# Patient Record
Sex: FEMALE | Race: WHITE | NOT HISPANIC OR LATINO | Employment: STUDENT | URBAN - METROPOLITAN AREA
[De-identification: names, ages, dates, MRNs, and addresses within clinical notes are randomized per-mention and may not be internally consistent; named-entity substitution may affect disease eponyms.]

---

## 2023-08-24 PROBLEM — Z76.89 ENCOUNTER TO ESTABLISH CARE: Status: ACTIVE | Noted: 2023-08-24

## 2023-08-24 NOTE — PROGRESS NOTES
Date: 08/31/2023  Patient ID: [id number]   Chief Complaint: Establish Care (New patient to establish care)    HPI:   Radha Godinez is a 22 y.o. female who is here today to establish care  From [facility] and will graduate college soon and needs a PCP.   Patient does go to therapy for anxiety, but she has been on Celexa for 2yrs (first and only med for anxiety) and feels like it is not helping anymore. Does get panic attacks 1/mo surrounding cycle. Denies SI/HI. Patient also has been trying to actively lose weight with diet and exercise but has instead gained weight for the past 2yrs.   Does have pruritic rash on posterior R thigh that started 2 mos ago with ingrown hair. Has scant thick white discharge at times. Does have h/o ingrown hairs in same spot multiple times and recently started on L leg. Does use topical wash that helps relieve itch. Denies fevers/chills, bleeding, drainage.   Past Medical History:   Diagnosis Date    Anxiety disorder, unspecified     Keratosis pilaris     Obesity (BMI 35.0-39.9 without comorbidity) 08/31/2023     Past Surgical History:   Procedure Laterality Date    TONSILLECTOMY      wisdom teeth removal       Review of patient's allergies indicates:   Allergen Reactions    Latex, natural rubber Rash     Outpatient Medications Marked as Taking for the 8/31/23 encounter (Office Visit) with Cindy Villa MD   Medication Sig Dispense Refill    norgestimate-ethinyl estradioL (ORTHO TRI-CYCLEN,TRI-SPRINTEC) 0.18/0.215/0.25 mg-35 mcg (28) tablet       [DISCONTINUED] citalopram (CELEXA) 20 MG tablet Take 20 mg by mouth every morning.       Family History   Problem Relation Age of Onset    Diabetes type II Father     Diabetes type II Paternal Grandmother     Diabetes type II Paternal Grandfather     Diabetes type I Maternal Uncle         Late onset      Social History     Socioeconomic History    Marital status: Single   Occupational History    Occupation: graduating environmental science 2023  "  Tobacco Use    Smoking status: Never    Smokeless tobacco: Never   Substance and Sexual Activity    Alcohol use: Yes     Comment: 1/wk    Drug use: Never    Sexual activity: Yes     Partners: Male     Comment: long term bf     Patient Care Team:  Cindy Villa MD as PCP - General (Family Medicine)  Eloina Prasad MD as Consulting Physician (Obstetrics and Gynecology)   Subjective:   ROS  See HPI for details  All Other ROS: Negative except as stated in HPI  Objective:   BP (!) 142/84   Pulse 91   Temp 98.4 °F (36.9 °C)   Ht 5' 11" (1.803 m)   Wt 120.2 kg (265 lb)   SpO2 96%   BMI 36.96 kg/m²   Physical Exam  Constitutional:       General: She is not in acute distress.     Appearance: She is obese. She is not ill-appearing.   Skin:     Comments: 1 cm subcutaneous nodule posterior proximal right thigh with hyperpigmentation and without erythema, bleeding, discharge, tenderness  Posterior left thigh with minimal excoriations without bleeding, discharge, tenderness, and edema   Neurological:      Mental Status: She is alert.       Assessment:       ICD-10-CM ICD-9-CM   1. Encounter to establish care  Z76.89 V65.8   2. Anxiety disorder, unspecified type  F41.9 300.00   3. Obesity (BMI 35.0-39.9 without comorbidity)  E66.9 278.00   4. Sebaceous cyst  L72.3 706.2      Plan:   1. Encounter to establish care  Overview:  Obtain routine labs  F/u for wellness      Orders:  -     CBC Auto Differential; Future; Expected date: 08/31/2023  -     Comprehensive Metabolic Panel; Future; Expected date: 08/31/2023  -     Lipid Panel; Future; Expected date: 08/31/2023  -     TSH; Future; Expected date: 08/31/2023  -     Hemoglobin A1C; Future; Expected date: 08/31/2023  -     Urinalysis; Future; Expected date: 08/31/2023  -     T4, Free; Future; Expected date: 08/31/2023  -     Vitamin D; Future; Expected date: 08/31/2023  -     HIV 1/2 Ag/Ab (4th Gen); Future; Expected date: 08/31/2023    2. Anxiety disorder, unspecified " type  Overview:  Stop Celexa  Start Zoloft 50mg qd  Practice deep breathing or abdominal breathing exercises when anxiety occurs.  Exercise daily. Get sunlight daily.  Avoid caffeine, alcohol and stimulants.  Practice positive phrases and repeat throughout the day, along with yoga and relaxation techniques.  Set healthy boundaries, avoid people and conversations that increase stress.  Educated patient on the risks of serotonin based medications such as serotonin modulators and SSRIs/SNRIs including common side effects of nausea, GI upset, headache dizziness as well as the rare risk for worsening symptoms of depression including development of suicidal thoughts or ideations, and serotonin syndrome.   Discussed benefits of medication not becoming noticeable until up to 6 weeks from start date.   Report any symptoms of suicidal or homicidal ideations immediately, if clinic is closed go to nearest emergency room.        Orders:  -     sertraline (ZOLOFT) 50 MG tablet; Take 1 tablet (50 mg total) by mouth once daily.  Dispense: 30 tablet; Refill: 11    3. Obesity (BMI 35.0-39.9 without comorbidity)  Overview:  Body mass index is 36.96 kg/m².  Recommend intensive, multicomponent, behavioral interventions:  Goal BMI <30.  Goal is to exercise at least 5 times a week for 30 minutes per day.   -Stand more each day.   -Increase exercise: Start with 10 minutes daily and build up to 60-90 minutes/day with moderate activity  Reduce caloric intake:  -Women: 6826-5721 kcal/day  Avoid soda, simple sugars, excessive rice, potatoes or bread. Limit fast foods and fried foods.  Choose complex carbs in moderation (example: green vegetables, beans, oatmeal). Eat plenty of fresh fruits and vegetables with lean meats daily.  Do not skip meals. Eat a balanced portion size.  Avoid fad diets. Consider long-term healthy life style changes.         4. Sebaceous cyst  Overview:  Keep area clean and dry  Refer to dermatology for excision   Start  Atarax for itching      Orders:  -     Discontinue: hydrOXYzine HCL (ATARAX) 25 MG tablet; Take 1 tablet (25 mg total) by mouth 3 (three) times daily.  Dispense: 30 tablet; Refill: 1  -     Ambulatory referral/consult to Dermatology; Future; Expected date: 09/07/2023  -     hydrOXYzine HCL (ATARAX) 25 MG tablet; Take 1 tablet (25 mg total) by mouth nightly as needed for Itching.  Dispense: 30 tablet; Refill: 1                Follow up in about 4 weeks (around 9/28/2023) for Wellness, with labs. In addition to their scheduled follow up, the patient has also been instructed to follow up on as needed basis.

## 2023-08-31 ENCOUNTER — OFFICE VISIT (OUTPATIENT)
Dept: PRIMARY CARE CLINIC | Facility: CLINIC | Age: 23
End: 2023-08-31
Payer: COMMERCIAL

## 2023-08-31 VITALS
SYSTOLIC BLOOD PRESSURE: 142 MMHG | BODY MASS INDEX: 37.1 KG/M2 | TEMPERATURE: 98 F | HEIGHT: 71 IN | OXYGEN SATURATION: 96 % | HEART RATE: 91 BPM | WEIGHT: 265 LBS | DIASTOLIC BLOOD PRESSURE: 84 MMHG

## 2023-08-31 DIAGNOSIS — E66.9 OBESITY (BMI 35.0-39.9 WITHOUT COMORBIDITY): ICD-10-CM

## 2023-08-31 DIAGNOSIS — F41.9 ANXIETY DISORDER, UNSPECIFIED TYPE: ICD-10-CM

## 2023-08-31 DIAGNOSIS — Z76.89 ENCOUNTER TO ESTABLISH CARE: Primary | ICD-10-CM

## 2023-08-31 DIAGNOSIS — L72.3 SEBACEOUS CYST: ICD-10-CM

## 2023-08-31 PROBLEM — Z78.9 USES BIRTH CONTROL: Status: ACTIVE | Noted: 2023-08-31

## 2023-08-31 PROBLEM — R21 RASH: Status: ACTIVE | Noted: 2023-08-31

## 2023-08-31 PROBLEM — R21 RASH: Status: RESOLVED | Noted: 2023-08-31 | Resolved: 2023-08-31

## 2023-08-31 PROCEDURE — 3008F PR BODY MASS INDEX (BMI) DOCUMENTED: ICD-10-PCS | Mod: CPTII,,, | Performed by: STUDENT IN AN ORGANIZED HEALTH CARE EDUCATION/TRAINING PROGRAM

## 2023-08-31 PROCEDURE — 99204 OFFICE O/P NEW MOD 45 MIN: CPT | Mod: ,,, | Performed by: STUDENT IN AN ORGANIZED HEALTH CARE EDUCATION/TRAINING PROGRAM

## 2023-08-31 PROCEDURE — 1159F PR MEDICATION LIST DOCUMENTED IN MEDICAL RECORD: ICD-10-PCS | Mod: CPTII,,, | Performed by: STUDENT IN AN ORGANIZED HEALTH CARE EDUCATION/TRAINING PROGRAM

## 2023-08-31 PROCEDURE — 1160F PR REVIEW ALL MEDS BY PRESCRIBER/CLIN PHARMACIST DOCUMENTED: ICD-10-PCS | Mod: CPTII,,, | Performed by: STUDENT IN AN ORGANIZED HEALTH CARE EDUCATION/TRAINING PROGRAM

## 2023-08-31 PROCEDURE — 3008F BODY MASS INDEX DOCD: CPT | Mod: CPTII,,, | Performed by: STUDENT IN AN ORGANIZED HEALTH CARE EDUCATION/TRAINING PROGRAM

## 2023-08-31 PROCEDURE — 1160F RVW MEDS BY RX/DR IN RCRD: CPT | Mod: CPTII,,, | Performed by: STUDENT IN AN ORGANIZED HEALTH CARE EDUCATION/TRAINING PROGRAM

## 2023-08-31 PROCEDURE — 3079F DIAST BP 80-89 MM HG: CPT | Mod: CPTII,,, | Performed by: STUDENT IN AN ORGANIZED HEALTH CARE EDUCATION/TRAINING PROGRAM

## 2023-08-31 PROCEDURE — 3077F PR MOST RECENT SYSTOLIC BLOOD PRESSURE >= 140 MM HG: ICD-10-PCS | Mod: CPTII,,, | Performed by: STUDENT IN AN ORGANIZED HEALTH CARE EDUCATION/TRAINING PROGRAM

## 2023-08-31 PROCEDURE — 3077F SYST BP >= 140 MM HG: CPT | Mod: CPTII,,, | Performed by: STUDENT IN AN ORGANIZED HEALTH CARE EDUCATION/TRAINING PROGRAM

## 2023-08-31 PROCEDURE — 3079F PR MOST RECENT DIASTOLIC BLOOD PRESSURE 80-89 MM HG: ICD-10-PCS | Mod: CPTII,,, | Performed by: STUDENT IN AN ORGANIZED HEALTH CARE EDUCATION/TRAINING PROGRAM

## 2023-08-31 PROCEDURE — 99204 PR OFFICE/OUTPT VISIT, NEW, LEVL IV, 45-59 MIN: ICD-10-PCS | Mod: ,,, | Performed by: STUDENT IN AN ORGANIZED HEALTH CARE EDUCATION/TRAINING PROGRAM

## 2023-08-31 PROCEDURE — 1159F MED LIST DOCD IN RCRD: CPT | Mod: CPTII,,, | Performed by: STUDENT IN AN ORGANIZED HEALTH CARE EDUCATION/TRAINING PROGRAM

## 2023-08-31 RX ORDER — NORGESTIMATE AND ETHINYL ESTRADIOL 7DAYSX3 28
KIT ORAL
COMMUNITY
Start: 2014-07-30

## 2023-08-31 RX ORDER — HYDROXYZINE HYDROCHLORIDE 25 MG/1
25 TABLET, FILM COATED ORAL 3 TIMES DAILY
Qty: 30 TABLET | Refills: 1 | Status: SHIPPED | OUTPATIENT
Start: 2023-08-31 | End: 2023-08-31

## 2023-08-31 RX ORDER — SERTRALINE HYDROCHLORIDE 50 MG/1
50 TABLET, FILM COATED ORAL DAILY
Qty: 30 TABLET | Refills: 11 | Status: SHIPPED | OUTPATIENT
Start: 2023-08-31 | End: 2023-09-29 | Stop reason: SDUPTHER

## 2023-08-31 RX ORDER — CITALOPRAM 20 MG/1
20 TABLET, FILM COATED ORAL EVERY MORNING
COMMUNITY
Start: 2023-07-30 | End: 2023-08-31

## 2023-08-31 RX ORDER — HYDROXYZINE HYDROCHLORIDE 25 MG/1
25 TABLET, FILM COATED ORAL NIGHTLY PRN
Qty: 30 TABLET | Refills: 1 | Status: SHIPPED | OUTPATIENT
Start: 2023-08-31

## 2023-09-21 ENCOUNTER — CLINICAL SUPPORT (OUTPATIENT)
Dept: PRIMARY CARE CLINIC | Facility: CLINIC | Age: 23
End: 2023-09-21
Payer: COMMERCIAL

## 2023-09-21 DIAGNOSIS — Z76.89 ENCOUNTER TO ESTABLISH CARE: ICD-10-CM

## 2023-09-21 PROBLEM — Z00.00 WELLNESS EXAMINATION: Status: ACTIVE | Noted: 2023-08-24

## 2023-09-21 LAB
ALBUMIN SERPL-MCNC: 3.6 G/DL (ref 3.5–5)
ALBUMIN/GLOB SERPL: 1.2 RATIO (ref 1.1–2)
ALP SERPL-CCNC: 86 UNIT/L (ref 40–150)
ALT SERPL-CCNC: 29 UNIT/L (ref 0–55)
APPEARANCE UR: ABNORMAL
AST SERPL-CCNC: 26 UNIT/L (ref 5–34)
BACTERIA #/AREA URNS AUTO: ABNORMAL /HPF
BASOPHILS # BLD AUTO: 0.03 X10(3)/MCL
BASOPHILS NFR BLD AUTO: 0.2 %
BILIRUB SERPL-MCNC: 0.8 MG/DL
BILIRUB UR QL STRIP.AUTO: ABNORMAL
BUN SERPL-MCNC: 9.2 MG/DL (ref 7–18.7)
CALCIUM SERPL-MCNC: 8.7 MG/DL (ref 8.4–10.2)
CHLORIDE SERPL-SCNC: 106 MMOL/L (ref 98–107)
CHOLEST SERPL-MCNC: 171 MG/DL
CHOLEST/HDLC SERPL: 5 {RATIO} (ref 0–5)
CO2 SERPL-SCNC: 25 MMOL/L (ref 22–29)
COLOR UR: ABNORMAL
CREAT SERPL-MCNC: 0.65 MG/DL (ref 0.55–1.02)
DEPRECATED CALCIDIOL+CALCIFEROL SERPL-MC: 55.3 NG/ML (ref 30–80)
EOSINOPHIL # BLD AUTO: 0.28 X10(3)/MCL (ref 0–0.9)
EOSINOPHIL NFR BLD AUTO: 2.3 %
ERYTHROCYTE [DISTWIDTH] IN BLOOD BY AUTOMATED COUNT: 12.7 % (ref 11.5–17)
EST. AVERAGE GLUCOSE BLD GHB EST-MCNC: 85.3 MG/DL
GFR SERPLBLD CREATININE-BSD FMLA CKD-EPI: >60 MLS/MIN/1.73/M2
GLOBULIN SER-MCNC: 3 GM/DL (ref 2.4–3.5)
GLUCOSE SERPL-MCNC: 90 MG/DL (ref 74–100)
GLUCOSE UR QL STRIP.AUTO: NEGATIVE
HBA1C MFR BLD: 4.6 %
HCT VFR BLD AUTO: 38.2 % (ref 37–47)
HDLC SERPL-MCNC: 38 MG/DL (ref 35–60)
HGB BLD-MCNC: 12.5 G/DL (ref 12–16)
HIV 1+2 AB+HIV1 P24 AG SERPL QL IA: NONREACTIVE
IMM GRANULOCYTES # BLD AUTO: 0.06 X10(3)/MCL (ref 0–0.04)
IMM GRANULOCYTES NFR BLD AUTO: 0.5 %
KETONES UR QL STRIP.AUTO: NEGATIVE
LDLC SERPL CALC-MCNC: 92 MG/DL (ref 50–140)
LEUKOCYTE ESTERASE UR QL STRIP.AUTO: ABNORMAL
LYMPHOCYTES # BLD AUTO: 3.21 X10(3)/MCL (ref 0.6–4.6)
LYMPHOCYTES NFR BLD AUTO: 26.5 %
MCH RBC QN AUTO: 28.9 PG (ref 27–31)
MCHC RBC AUTO-ENTMCNC: 32.7 G/DL (ref 33–36)
MCV RBC AUTO: 88.2 FL (ref 80–94)
MONOCYTES # BLD AUTO: 0.83 X10(3)/MCL (ref 0.1–1.3)
MONOCYTES NFR BLD AUTO: 6.8 %
NEUTROPHILS # BLD AUTO: 7.72 X10(3)/MCL (ref 2.1–9.2)
NEUTROPHILS NFR BLD AUTO: 63.7 %
NITRITE UR QL STRIP.AUTO: NEGATIVE
NRBC BLD AUTO-RTO: 0 %
PH UR STRIP.AUTO: 6 [PH]
PLATELET # BLD AUTO: 228 X10(3)/MCL (ref 130–400)
PMV BLD AUTO: 11.3 FL (ref 7.4–10.4)
POTASSIUM SERPL-SCNC: 4 MMOL/L (ref 3.5–5.1)
PROT SERPL-MCNC: 6.6 GM/DL (ref 6.4–8.3)
PROT UR QL STRIP.AUTO: ABNORMAL
RBC # BLD AUTO: 4.33 X10(6)/MCL (ref 4.2–5.4)
RBC #/AREA URNS AUTO: ABNORMAL /HPF
RBC UR QL AUTO: NEGATIVE
SODIUM SERPL-SCNC: 139 MMOL/L (ref 136–145)
SP GR UR STRIP.AUTO: 1.03 (ref 1–1.03)
SQUAMOUS #/AREA URNS AUTO: 25 /HPF
T4 FREE SERPL-MCNC: 0.91 NG/DL (ref 0.7–1.48)
TRIGL SERPL-MCNC: 205 MG/DL (ref 37–140)
TSH SERPL-ACNC: 4.36 UIU/ML (ref 0.35–4.94)
UROBILINOGEN UR STRIP-ACNC: 1
VLDLC SERPL CALC-MCNC: 41 MG/DL
WBC # SPEC AUTO: 12.13 X10(3)/MCL (ref 4.5–11.5)
WBC #/AREA URNS AUTO: 13 /HPF

## 2023-09-21 PROCEDURE — 36415 COLL VENOUS BLD VENIPUNCTURE: CPT

## 2023-09-21 PROCEDURE — 36415 COLL VENOUS BLD VENIPUNCTURE: CPT | Mod: ,,, | Performed by: STUDENT IN AN ORGANIZED HEALTH CARE EDUCATION/TRAINING PROGRAM

## 2023-09-21 PROCEDURE — 36415 PR COLLECTION VENOUS BLOOD,VENIPUNCTURE: ICD-10-PCS | Mod: ,,, | Performed by: STUDENT IN AN ORGANIZED HEALTH CARE EDUCATION/TRAINING PROGRAM

## 2023-09-21 NOTE — PROGRESS NOTES
Date: 09/29/2023  Patient ID: [id number]   Chief Complaint: Annual Exam    HPI:   Radha Godinez is a 22 y.o. female here today for an annual wellness visit. Reviewed and discussed lab results. Elevated triglycerides.  Otherwise labs WNL  Overall she feels well. No other complaints today. LMP 9/6/23 ago, regular, monthly. Last visit Hydroxyzine helped with itching and rash improved but made her very drowsy, so she stopped taking. Also patient was started on Zoloft, which has been helping with anxiety.    Diet: Better. Eating more fruits/vegetables. Trying cookbook  Activity level: new routine with work. Goes between classes if possible.    Patient Active Problem List   Diagnosis    Wellness examination    Uses birth control    Anxiety disorder, unspecified    Obesity (BMI 35.0-39.9 without comorbidity)    Sebaceous cyst    HSV-1    Hypertriglyceridemia     Outpatient Medications Marked as Taking for the 9/29/23 encounter (Office Visit) with Cindy Villa MD   Medication Sig Dispense Refill    sertraline (ZOLOFT) 50 MG tablet Take 1 tablet (50 mg total) by mouth once daily. 90 tablet 3    [DISCONTINUED] sertraline (ZOLOFT) 50 MG tablet Take 1 tablet (50 mg total) by mouth once daily. 30 tablet 11     Past Medical History:   Diagnosis Date    Anxiety disorder, unspecified     HSV-1 9/29/2023    Keratosis pilaris     Obesity (BMI 35.0-39.9 without comorbidity) 08/31/2023     Past Surgical History:   Procedure Laterality Date    TONSILLECTOMY      wisdom teeth removal       Review of patient's allergies indicates:   Allergen Reactions    Latex, natural rubber Rash     Family History   Problem Relation Age of Onset    Diabetes type II Father     Diabetes type II Paternal Grandmother     Diabetes type II Paternal Grandfather     Diabetes type I Maternal Uncle         Late onset      Social History     Socioeconomic History    Marital status: Single   Occupational History    Occupation: graduating environmental science  2023   Tobacco Use    Smoking status: Never    Smokeless tobacco: Never   Substance and Sexual Activity    Alcohol use: Yes     Comment: 1/wk    Drug use: Never    Sexual activity: Yes     Partners: Male     Comment: long term bf     Social Determinants of Health     Financial Resource Strain: Low Risk  (9/29/2023)    Overall Financial Resource Strain (CARDIA)     Difficulty of Paying Living Expenses: Not hard at all   Food Insecurity: No Food Insecurity (9/29/2023)    Hunger Vital Sign     Worried About Running Out of Food in the Last Year: Never true     Ran Out of Food in the Last Year: Never true   Transportation Needs: No Transportation Needs (9/29/2023)    PRAPARE - Transportation     Lack of Transportation (Medical): No     Lack of Transportation (Non-Medical): No   Physical Activity: Insufficiently Active (9/29/2023)    Exercise Vital Sign     Days of Exercise per Week: 2 days     Minutes of Exercise per Session: 60 min   Stress: No Stress Concern Present (9/29/2023)    [employer] [place] - Occupational Stress Questionnaire     Feeling of Stress : Not at all   Social Connections: Moderately Isolated (9/29/2023)    Social Connection and Isolation Panel [NHANES]     Frequency of Communication with Friends and Family: More than three times a week     Frequency of Social Gatherings with Friends and Family: Once a week     Attends Mandaen Services: Never     Active Member of Clubs or Organizations: Yes     Attends Club or Organization Meetings: More than 4 times per year     Marital Status: Never    Housing Stability: Unknown (9/29/2023)    Housing Stability Vital Sign     Unable to Pay for Housing in the Last Year: No     Unstable Housing in the Last Year: No     Patient Care Team:  Cindy Villa MD as PCP - General (Family Medicine)  Eloina Prasad MD as Consulting Physician (Obstetrics and Gynecology)     Subjective:     Review of Systems   Constitutional: Negative.   "Negative for fever and weight loss.   HENT:  Negative for congestion, hearing loss and sore throat.         Had an episodes of band-like frontal ha wrapping to side of head, improved after eating. Thinks might have been dehydrated   Eyes:  Negative for blurred vision.   Respiratory:  Negative for cough and shortness of breath.    Cardiovascular:  Negative for chest pain, palpitations and leg swelling.   Gastrointestinal:  Positive for constipation (improved with laxative). Negative for abdominal pain, blood in stool, diarrhea, nausea and vomiting.   Genitourinary:  Negative for dysuria, frequency, hematuria and urgency.   Musculoskeletal:  Negative for joint pain.   Skin:  Negative for rash.   Neurological:  Negative for weakness and headaches.   Psychiatric/Behavioral:  Negative for depression. The patient is not nervous/anxious and does not have insomnia.        See HPI for details  All Other ROS: Negative except as stated in HPI    Objective:     /76   Pulse 93   Temp 98 °F (36.7 °C)   Ht 5' 11" (1.803 m)   Wt 119.6 kg (263 lb 9.6 oz)   SpO2 97%   BMI 36.76 kg/m²     Physical Exam  Vitals reviewed.   Constitutional:       Appearance: Normal appearance.   HENT:      Head: Normocephalic and atraumatic.      Right Ear: Tympanic membrane, ear canal and external ear normal.      Left Ear: Tympanic membrane, ear canal and external ear normal.      Nose: Nose normal. No congestion or rhinorrhea.      Mouth/Throat:      Mouth: Mucous membranes are moist.      Pharynx: Oropharynx is clear.   Eyes:      General: No scleral icterus.     Extraocular Movements: Extraocular movements intact.      Conjunctiva/sclera: Conjunctivae normal.   Cardiovascular:      Rate and Rhythm: Normal rate and regular rhythm.      Pulses: Normal pulses.      Heart sounds: Normal heart sounds.   Pulmonary:      Effort: Pulmonary effort is normal.      Breath sounds: Normal breath sounds.   Abdominal:      General: Abdomen is flat. " Bowel sounds are normal.      Palpations: Abdomen is soft.      Tenderness: There is abdominal tenderness (minimal TT deep palation R lower abdomen - menses about to start). There is no guarding or rebound.   Musculoskeletal:         General: No swelling or deformity. Normal range of motion.      Cervical back: Normal range of motion and neck supple.   Skin:     General: Skin is warm and dry.   Neurological:      Mental Status: She is alert and oriented to person, place, and time.   Psychiatric:         Mood and Affect: Mood normal.         Behavior: Behavior normal.         Thought Content: Thought content normal.         Judgment: Judgment normal.         Assessment:       ICD-10-CM ICD-9-CM   1. Wellness examination  Z00.00 V70.0   2. Hypertriglyceridemia  E78.1 272.1   3. Anxiety disorder, unspecified type  F41.9 300.00        Plan:     1. Wellness examination  Overview:  Routine labs and preventative care discussed.  Continue healthy lifestyle modifications          2. Hypertriglyceridemia  Overview:  Encourage weight loss by least 5% and to increase aerobic exercise and resistance training  Limit simple sugars and simple carbohydrate intake-focus on low glycemic foods  Optimize blood sugar control  The ASCVD Risk score ([place] DK, et al., 2019) failed to calculate for the following reasons:    The 2019 ASCVD risk score is only valid for ages 40 to 79             3. Anxiety disorder, unspecified type  Overview:  Previously Celexa did not improve symptoms  Continue Zoloft 50mg qd  Practice deep breathing or abdominal breathing exercises when anxiety occurs.  Exercise daily. Get sunlight daily.  Avoid caffeine, alcohol and stimulants.  Practice positive phrases and repeat throughout the day, along with yoga and relaxation techniques.  Set healthy boundaries, avoid people and conversations that increase stress.  Educated patient on the risks of serotonin based medications such as serotonin modulators and  SSRIs/SNRIs including common side effects of nausea, GI upset, headache dizziness as well as the rare risk for worsening symptoms of depression including development of suicidal thoughts or ideations, and serotonin syndrome.   Discussed benefits of medication not becoming noticeable until up to 6 weeks from start date.   Report any symptoms of suicidal or homicidal ideations immediately, if clinic is closed go to nearest emergency room.        Orders:  -     sertraline (ZOLOFT) 50 MG tablet; Take 1 tablet (50 mg total) by mouth once daily.  Dispense: 90 tablet; Refill: 3         Medication List with Changes/Refills   Current Medications    HYDROXYZINE HCL (ATARAX) 25 MG TABLET    Take 1 tablet (25 mg total) by mouth nightly as needed for Itching.       Start Date: 8/31/2023 End Date: --    NORGESTIMATE-ETHINYL ESTRADIOL (ORTHO TRI-CYCLEN,TRI-SPRINTEC) 0.18/0.215/0.25 MG-35 MCG (28) TABLET           Start Date: 7/30/2014 End Date: --   Changed and/or Refilled Medications    Modified Medication Previous Medication    SERTRALINE (ZOLOFT) 50 MG TABLET sertraline (ZOLOFT) 50 MG tablet       Take 1 tablet (50 mg total) by mouth once daily.    Take 1 tablet (50 mg total) by mouth once daily.       Start Date: 9/29/2023 End Date: 9/28/2024    Start Date: 8/31/2023 End Date: 9/29/2023        The patient's Health Maintenance was reviewed and the following appears to be due at this time:   Health Maintenance Due   Topic Date Due    Chlamydia Screening  Never done    TETANUS VACCINE  Never done    Pap Smear  Never done    COVID-19 Vaccine (4 - Pfizer series) 02/24/2022    Influenza Vaccine (1) 09/01/2023     The patient has no Health Maintenance topics of status Not Due     Alcohol/Tobacco Use - Discussed importance of smoking avoidance/cessation and limiting alcohol intake.    CVD Risk Factors - Reviewed and discussed with patient    Obesity/Physical Activity - BMI = Body mass index is 36.76 kg/m².. Encouraged 30 minute daily  physical activity, 5 days per week.     STD screening (At least once 15-65y or when necessary) - negative     Depression screening (Every year or when necessary)- negative    Osteoporosis Screening (start at 66yo or 50y with risk) - future    Colon Cancer Screening - future    Cervical Cancer Screening - pending November at OBGYN    Breast Cancer Screening - future    Eye Exam - Recommend annual eye exams.    Dental Exam - Recommend biannual exams.     Vaccinations - will obtain at pharmacy    Follow up in about 1 year (around 9/29/2024) for Wellness. In addition to their scheduled follow up, the patient has also been instructed to follow up on as needed basis.

## 2023-09-23 LAB — BACTERIA UR CULT: NORMAL

## 2023-09-29 ENCOUNTER — OFFICE VISIT (OUTPATIENT)
Dept: PRIMARY CARE CLINIC | Facility: CLINIC | Age: 23
End: 2023-09-29
Payer: COMMERCIAL

## 2023-09-29 VITALS
SYSTOLIC BLOOD PRESSURE: 124 MMHG | WEIGHT: 263.63 LBS | HEART RATE: 93 BPM | HEIGHT: 71 IN | BODY MASS INDEX: 36.91 KG/M2 | DIASTOLIC BLOOD PRESSURE: 76 MMHG | OXYGEN SATURATION: 97 % | TEMPERATURE: 98 F

## 2023-09-29 DIAGNOSIS — Z00.00 WELLNESS EXAMINATION: Primary | ICD-10-CM

## 2023-09-29 DIAGNOSIS — F41.9 ANXIETY DISORDER, UNSPECIFIED TYPE: ICD-10-CM

## 2023-09-29 DIAGNOSIS — E78.1 HYPERTRIGLYCERIDEMIA: ICD-10-CM

## 2023-09-29 PROBLEM — B00.9 HSV INFECTION: Status: ACTIVE | Noted: 2023-09-29

## 2023-09-29 PROCEDURE — 99395 PR PREVENTIVE VISIT,EST,18-39: ICD-10-PCS | Mod: ,,, | Performed by: STUDENT IN AN ORGANIZED HEALTH CARE EDUCATION/TRAINING PROGRAM

## 2023-09-29 PROCEDURE — 1159F PR MEDICATION LIST DOCUMENTED IN MEDICAL RECORD: ICD-10-PCS | Mod: CPTII,,, | Performed by: STUDENT IN AN ORGANIZED HEALTH CARE EDUCATION/TRAINING PROGRAM

## 2023-09-29 PROCEDURE — 3044F HG A1C LEVEL LT 7.0%: CPT | Mod: CPTII,,, | Performed by: STUDENT IN AN ORGANIZED HEALTH CARE EDUCATION/TRAINING PROGRAM

## 2023-09-29 PROCEDURE — 3078F PR MOST RECENT DIASTOLIC BLOOD PRESSURE < 80 MM HG: ICD-10-PCS | Mod: CPTII,,, | Performed by: STUDENT IN AN ORGANIZED HEALTH CARE EDUCATION/TRAINING PROGRAM

## 2023-09-29 PROCEDURE — 1159F MED LIST DOCD IN RCRD: CPT | Mod: CPTII,,, | Performed by: STUDENT IN AN ORGANIZED HEALTH CARE EDUCATION/TRAINING PROGRAM

## 2023-09-29 PROCEDURE — 3078F DIAST BP <80 MM HG: CPT | Mod: CPTII,,, | Performed by: STUDENT IN AN ORGANIZED HEALTH CARE EDUCATION/TRAINING PROGRAM

## 2023-09-29 PROCEDURE — 3074F PR MOST RECENT SYSTOLIC BLOOD PRESSURE < 130 MM HG: ICD-10-PCS | Mod: CPTII,,, | Performed by: STUDENT IN AN ORGANIZED HEALTH CARE EDUCATION/TRAINING PROGRAM

## 2023-09-29 PROCEDURE — 3008F BODY MASS INDEX DOCD: CPT | Mod: CPTII,,, | Performed by: STUDENT IN AN ORGANIZED HEALTH CARE EDUCATION/TRAINING PROGRAM

## 2023-09-29 PROCEDURE — 3008F PR BODY MASS INDEX (BMI) DOCUMENTED: ICD-10-PCS | Mod: CPTII,,, | Performed by: STUDENT IN AN ORGANIZED HEALTH CARE EDUCATION/TRAINING PROGRAM

## 2023-09-29 PROCEDURE — 3074F SYST BP LT 130 MM HG: CPT | Mod: CPTII,,, | Performed by: STUDENT IN AN ORGANIZED HEALTH CARE EDUCATION/TRAINING PROGRAM

## 2023-09-29 PROCEDURE — 3044F PR MOST RECENT HEMOGLOBIN A1C LEVEL <7.0%: ICD-10-PCS | Mod: CPTII,,, | Performed by: STUDENT IN AN ORGANIZED HEALTH CARE EDUCATION/TRAINING PROGRAM

## 2023-09-29 PROCEDURE — 99395 PREV VISIT EST AGE 18-39: CPT | Mod: ,,, | Performed by: STUDENT IN AN ORGANIZED HEALTH CARE EDUCATION/TRAINING PROGRAM

## 2023-09-29 RX ORDER — SERTRALINE HYDROCHLORIDE 50 MG/1
50 TABLET, FILM COATED ORAL DAILY
Qty: 90 TABLET | Refills: 3 | Status: SHIPPED | OUTPATIENT
Start: 2023-09-29 | End: 2024-09-28

## 2023-12-27 LAB
C TRACH DNA SPEC QL NAA+PROBE: NEGATIVE
NEISSERIA GONORRHEA BY PCR: NEGATIVE
PAP RECOMMENDATION EXT: NORMAL
TRICHOMONAS VAGINALIS: NEGATIVE

## 2024-01-01 PROBLEM — Z00.00 WELLNESS EXAMINATION: Status: RESOLVED | Noted: 2023-08-24 | Resolved: 2024-01-01

## 2024-07-15 ENCOUNTER — TELEPHONE (OUTPATIENT)
Dept: PRIMARY CARE CLINIC | Facility: CLINIC | Age: 24
End: 2024-07-15
Payer: COMMERCIAL

## 2024-07-15 DIAGNOSIS — F41.9 ANXIETY DISORDER, UNSPECIFIED TYPE: ICD-10-CM

## 2024-07-15 RX ORDER — SERTRALINE HYDROCHLORIDE 50 MG/1
50 TABLET, FILM COATED ORAL DAILY
Qty: 30 TABLET | Refills: 3 | Status: SHIPPED | OUTPATIENT
Start: 2024-07-15 | End: 2025-07-15

## 2024-07-15 NOTE — TELEPHONE ENCOUNTER
----- Message from Karmen Locke sent at 7/15/2024  3:46 PM CDT -----  .Who Called: Radha Godinez    Refill or New Rx:Refill  RX Name and Strength:sertraline (ZOLOFT) 50 MG tablet  How is the patient currently taking it? (ex. 1XDay):Sig - Route: Take 1 tablet (50 mg total) by mouth once daily. - Oral  Is this a 30 day or 90 day RX:90  Local or Mail Order:Local  List of preferred pharmacies on file (remove unneeded):   [facility] [address] [place] [address]   Phone: [phone]  Fax: [phone]        Ordering Provider:Dr. Villa      Preferred Method of Contact: Phone Call  Patient's Preferred Phone Number on File: [phone]   Best Call Back Number, if different:  Additional Information: Pt states she attempted to get medication refilled but was told her insurance would not approve a 90 day supply but will approve a 30 day. Pt also stated she would like a call back. Please advise, thank you

## 2024-10-22 DIAGNOSIS — F41.9 ANXIETY DISORDER, UNSPECIFIED TYPE: ICD-10-CM

## 2024-10-22 RX ORDER — SERTRALINE HYDROCHLORIDE 50 MG/1
50 TABLET, FILM COATED ORAL DAILY
Qty: 30 TABLET | Refills: 3 | Status: SHIPPED | OUTPATIENT
Start: 2024-10-22 | End: 2025-10-22

## 2024-11-07 ENCOUNTER — DOCUMENTATION ONLY (OUTPATIENT)
Dept: ADMINISTRATIVE | Facility: HOSPITAL | Age: 24
End: 2024-11-07
Payer: COMMERCIAL

## 2025-03-07 ENCOUNTER — OFFICE VISIT (OUTPATIENT)
Dept: INTERNAL MEDICINE | Facility: CLINIC | Age: 25
End: 2025-03-07
Payer: COMMERCIAL

## 2025-03-07 VITALS
WEIGHT: 272.25 LBS | DIASTOLIC BLOOD PRESSURE: 78 MMHG | SYSTOLIC BLOOD PRESSURE: 138 MMHG | HEART RATE: 100 BPM | OXYGEN SATURATION: 98 % | BODY MASS INDEX: 38.11 KG/M2 | HEIGHT: 71 IN

## 2025-03-07 DIAGNOSIS — Z13.29 SCREENING FOR THYROID DISORDER: ICD-10-CM

## 2025-03-07 DIAGNOSIS — F41.9 ANXIETY DISORDER, UNSPECIFIED TYPE: ICD-10-CM

## 2025-03-07 DIAGNOSIS — Z23 NEED FOR TETANUS BOOSTER: ICD-10-CM

## 2025-03-07 DIAGNOSIS — Z00.00 PREVENTATIVE HEALTH CARE: Primary | ICD-10-CM

## 2025-03-07 DIAGNOSIS — E78.1 HYPERTRIGLYCERIDEMIA: ICD-10-CM

## 2025-03-07 DIAGNOSIS — Z13.1 SCREENING FOR DIABETES MELLITUS (DM): ICD-10-CM

## 2025-03-07 LAB
BILIRUB UR QL STRIP: NEGATIVE
CLARITY UR: CLEAR
COLOR UR: YELLOW
GLUCOSE UR QL STRIP: NEGATIVE
HGB UR QL STRIP: NEGATIVE
KETONES UR QL STRIP: NEGATIVE
LEUKOCYTE ESTERASE UR QL STRIP: NEGATIVE
NITRITE UR QL STRIP: NEGATIVE
PH UR STRIP: 6 [PH] (ref 5–8)
PROT UR QL STRIP: NEGATIVE
SP GR UR STRIP: 1.02 (ref 1–1.03)
URN SPEC COLLECT METH UR: NORMAL

## 2025-03-07 PROCEDURE — 87591 N.GONORRHOEAE DNA AMP PROB: CPT | Performed by: NURSE PRACTITIONER

## 2025-03-07 PROCEDURE — 85025 COMPLETE CBC W/AUTO DIFF WBC: CPT | Performed by: NURSE PRACTITIONER

## 2025-03-07 PROCEDURE — 83036 HEMOGLOBIN GLYCOSYLATED A1C: CPT | Performed by: NURSE PRACTITIONER

## 2025-03-07 PROCEDURE — 84443 ASSAY THYROID STIM HORMONE: CPT | Performed by: NURSE PRACTITIONER

## 2025-03-07 PROCEDURE — 99999 PR PBB SHADOW E&M-EST. PATIENT-LVL III: CPT | Mod: PBBFAC,,, | Performed by: NURSE PRACTITIONER

## 2025-03-07 PROCEDURE — 81003 URINALYSIS AUTO W/O SCOPE: CPT | Performed by: NURSE PRACTITIONER

## 2025-03-07 PROCEDURE — 84439 ASSAY OF FREE THYROXINE: CPT | Performed by: NURSE PRACTITIONER

## 2025-03-07 PROCEDURE — 80061 LIPID PANEL: CPT | Performed by: NURSE PRACTITIONER

## 2025-03-07 PROCEDURE — 80053 COMPREHEN METABOLIC PANEL: CPT | Performed by: NURSE PRACTITIONER

## 2025-03-07 RX ORDER — SERTRALINE HYDROCHLORIDE 50 MG/1
50 TABLET, FILM COATED ORAL DAILY
Qty: 90 TABLET | Refills: 0 | Status: SHIPPED | OUTPATIENT
Start: 2025-03-07

## 2025-03-07 RX ORDER — VALACYCLOVIR HYDROCHLORIDE 500 MG/1
500 TABLET, FILM COATED ORAL 2 TIMES DAILY
COMMUNITY
Start: 2024-11-19

## 2025-03-07 NOTE — PROGRESS NOTES
Subjective:      Patient ID: Radha Godinez is a 24 y.o. female.    Chief Complaint: Annual Exam    History of Present Illness    CHIEF COMPLAINT:  Radha presents today for annual physical exam    The patient presents today for an establish care appointment. It was explained to the patient that I could not be their Primary Care Provider and that I will my have office staff to schedule an establish care appointment with my collaborating physician, Dr. Kiel Foy at a later time. The patient was in agreement with scheduling an establish care appointment with Dr. Foy at later time.        FACIAL AND SKIN CONCERNS:  She reports facial redness and swelling with occasional localized tenderness that resolves quickly. She was previously diagnosed with Keratosis Pilaris (KP) by a dermatologist, which has been worsening recently.    TMJ:  She reports jaw popping with mouth opening and intermittent jaw tenderness that resolves within a day. She acknowledges regular gum chewing habit.    ENT:  She experiences recurring episodes of allergy-like symptoms with fluid in the ears, confirmed during urgent care visits. She attempted Allegra but experienced severe insomnia as a side effect. She has not been taking daily antihistamines as recommended.    MEDICATIONS:  She currently takes Zoloft (needs refill) and oral contraceptives.    SOCIAL HISTORY:  She works at the [employer] in [employer].    ALLERGIES:  She reports potential latex and natural rubber allergy (not formally tested). She denies allergic reactions to tetanus vaccines. Family history significant for maternal latex allergy.      The patient presents today for an establish care appointment. It was explained to the patient that I could not be their Primary Care Provider and that I will my have office staff to schedule an establish care appointment with my collaborating physician, Dr. Kiel Foy at a later time. The patient was in  "agreement with scheduling an establish care appointment with Dr. Foy at later time.       Problem List[1]    Current Medications[2]      Objective:   /78 (BP Location: Left arm, Patient Position: Sitting)   Pulse 100   Ht 5' 11" (1.803 m)   Wt 123.5 kg (272 lb 4.3 oz)   LMP 02/21/2025 (Approximate)   SpO2 98%   BMI 37.97 kg/m²     Physical Exam             Physical Exam  Vitals and nursing note reviewed.   Constitutional:       General: She is awake. She is not in acute distress.     Appearance: Normal appearance. She is well-developed and well-groomed. She is not ill-appearing, toxic-appearing or diaphoretic.   HENT:      Head: Normocephalic and atraumatic.      Right Ear: Tympanic membrane, ear canal and external ear normal. No middle ear effusion.      Left Ear: Ear canal and external ear normal. A middle ear effusion is present.      Nose: No congestion or rhinorrhea.      Mouth/Throat:      Pharynx: No oropharyngeal exudate or posterior oropharyngeal erythema.   Eyes:      Conjunctiva/sclera: Conjunctivae normal.      Pupils: Pupils are equal, round, and reactive to light.   Neck:      Thyroid: No thyroid mass, thyromegaly or thyroid tenderness.      Vascular: Normal carotid pulses. No carotid bruit or JVD.   Cardiovascular:      Rate and Rhythm: Normal rate and regular rhythm.      Heart sounds: Normal heart sounds. No murmur heard.  Pulmonary:      Effort: Pulmonary effort is normal.      Breath sounds: Normal breath sounds.   Abdominal:      General: Abdomen is flat. Bowel sounds are normal. There is no distension.      Palpations: Abdomen is soft. There is no hepatomegaly or splenomegaly.      Tenderness: There is no abdominal tenderness. There is no right CVA tenderness, left CVA tenderness, guarding or rebound. Negative signs include Brown's sign and McBurney's sign.   Musculoskeletal:         General: No swelling, tenderness, deformity or signs of injury.      Cervical back: Normal " range of motion and neck supple.      Right lower leg: No edema.      Left lower leg: No edema.   Skin:     General: Skin is warm and dry.      Capillary Refill: Capillary refill takes less than 2 seconds.      Findings: Rash (bilateral cheeks) present.   Neurological:      General: No focal deficit present.      Mental Status: She is alert and oriented to person, place, and time.      GCS: GCS eye subscore is 4. GCS verbal subscore is 5. GCS motor subscore is 6.   Psychiatric:         Attention and Perception: Attention and perception normal.         Mood and Affect: Mood and affect normal.         Speech: Speech normal.         Behavior: Behavior normal. Behavior is cooperative.         Thought Content: Thought content normal.         Cognition and Memory: Cognition normal.         Judgment: Judgment normal.          Assessment/Plan :   1. Preventative health care  -     CBC Auto Differential  -     Comprehensive Metabolic Panel  -     Urinalysis, Reflex to Urine Culture Urine, Clean Catch  -     C. trachomatis/N. gonorrhoeae by AMP DNA    2. Hypertriglyceridemia  Overview:  Encourage weight loss by least 5% and to increase aerobic exercise and resistance training  Limit simple sugars and simple carbohydrate intake-focus on low glycemic foods  Optimize blood sugar control  The ASCVD Risk score (Schaumburg DK, et al., 2019) failed to calculate for the following reasons:    The 2019 ASCVD risk score is only valid for ages 40 to 79           Orders:  -     Lipid Panel    3. Screening for diabetes mellitus (DM)  -     Hemoglobin A1C    4. Screening for thyroid disorder  -     TSH  -     T4, Free    5. Need for tetanus booster  -     Tdap (BOOSTRIX) vaccine injection 0.5 mL    6. Anxiety disorder, unspecified type  Overview:  Previously Celexa did not improve symptoms  Continue Zoloft 50mg qd  Practice deep breathing or abdominal breathing exercises when anxiety occurs.  Exercise daily. Get sunlight daily.  Avoid caffeine,  alcohol and stimulants.  Practice positive phrases and repeat throughout the day, along with yoga and relaxation techniques.  Set healthy boundaries, avoid people and conversations that increase stress.  Educated patient on the risks of serotonin based medications such as serotonin modulators and SSRIs/SNRIs including common side effects of nausea, GI upset, headache dizziness as well as the rare risk for worsening symptoms of depression including development of suicidal thoughts or ideations, and serotonin syndrome.   Discussed benefits of medication not becoming noticeable until up to 6 weeks from start date.   Report any symptoms of suicidal or homicidal ideations immediately, if clinic is closed go to nearest emergency room.        Orders:  -     sertraline (ZOLOFT) 50 MG tablet; Take 1 tablet (50 mg total) by mouth once daily.  Dispense: 90 tablet; Refill: 0         Assessment & Plan    Reviewed previous labs, noting abnormal triglycerides  Assessed need for annual physical exam  Evaluated ear condition, planning for further analysis  Considered facial redness, reviewing previous dermatological diagnosis of Keratosis Pilaris (KP) rather than rosacea  Assessed jaw popping, suspecting TMJ  Noted recurring allergy-like symptoms with fluid in ears, considering daily antihistamine recommendation    KERATOSIS PILARIS (KP) AND FACIAL REDNESS:  - Observed redness on the patient's face.  - Radha reports worsening of Keratosis Pilaris (KP) and facial redness.  - Advised the patient to return to dermatologist for follow-up.    TEMPOROMANDIBULAR JOINT (TMJ) DISORDER:  - Radha reports jaw popping when opening mouth.  - Identified excessive gum chewing as a potential cause of TMJ symptoms.  - Explained that TMJ can progress and lead to severe jaw pain if not addressed.  - Advised the patient to avoid excessive chewing, including gum and steak, to prevent worsening of TMJ symptoms.  - Radha should avoid chewing gum and foods  requiring excessive chewing to manage TMJ symptoms.    ALLERGY-LIKE SYMPTOMS:  - Radha reports occasional bouts of allergy-like symptoms and fluid in ears.  - Plan to perform another ear analysis.  - Recommend daily antihistamine use.  - Radha reports occasional bouts of allergy-like symptoms.  - Discussed Allegra as a non-drowsy antihistamine option.  - Recommend considering taking a daily antihistamine for allergy-like symptoms.    LATEX ALLERGY:  - Radha reports mother's latex allergy but is uncertain about own status.  - Advised the patient to confirm latex allergy status.  - Radha mentions mother's latex allergy.  - Discussed the hereditary nature of allergies.          Follow up if symptoms worsen or fail to improve.    This note was generated with the assistance of ambient listening technology. Verbal consent was obtained by the patient and accompanying visitor(s) for the recording of patient appointment to facilitate this note. I attest to having reviewed and edited the generated note for accuracy, though some syntax or spelling errors may persist. Please contact the author of this note for any clarification.            [1]   Patient Active Problem List  Diagnosis    Uses birth control    Anxiety disorder, unspecified    Obesity (BMI 35.0-39.9 without comorbidity)    Sebaceous cyst    HSV-1    Hypertriglyceridemia   [2]   Current Outpatient Medications:     norgestimate-ethinyl estradioL (ORTHO TRI-CYCLEN,TRI-SPRINTEC) 0.18/0.215/0.25 mg-35 mcg (28) tablet, , Disp: , Rfl:     valACYclovir (VALTREX) 500 MG tablet, Take 500 mg by mouth 2 (two) times daily., Disp: , Rfl:     sertraline (ZOLOFT) 50 MG tablet, Take 1 tablet (50 mg total) by mouth once daily., Disp: 90 tablet, Rfl: 0

## 2025-03-08 LAB
ALBUMIN SERPL BCP-MCNC: 3.5 G/DL (ref 3.5–5.2)
ALP SERPL-CCNC: 87 U/L (ref 40–150)
ALT SERPL W/O P-5'-P-CCNC: 42 U/L (ref 10–44)
ANION GAP SERPL CALC-SCNC: 11 MMOL/L (ref 8–16)
AST SERPL-CCNC: 64 U/L (ref 10–40)
BASOPHILS # BLD AUTO: 0.04 K/UL (ref 0–0.2)
BASOPHILS NFR BLD: 0.3 % (ref 0–1.9)
BILIRUB SERPL-MCNC: 0.5 MG/DL (ref 0.1–1)
BUN SERPL-MCNC: 9 MG/DL (ref 6–20)
CALCIUM SERPL-MCNC: 9.2 MG/DL (ref 8.7–10.5)
CHLORIDE SERPL-SCNC: 108 MMOL/L (ref 95–110)
CHOLEST SERPL-MCNC: 193 MG/DL (ref 120–199)
CHOLEST/HDLC SERPL: 4.3 {RATIO} (ref 2–5)
CO2 SERPL-SCNC: 21 MMOL/L (ref 23–29)
CREAT SERPL-MCNC: 0.6 MG/DL (ref 0.5–1.4)
DIFFERENTIAL METHOD BLD: ABNORMAL
EOSINOPHIL # BLD AUTO: 0.4 K/UL (ref 0–0.5)
EOSINOPHIL NFR BLD: 2.4 % (ref 0–8)
ERYTHROCYTE [DISTWIDTH] IN BLOOD BY AUTOMATED COUNT: 12.9 % (ref 11.5–14.5)
EST. GFR  (NO RACE VARIABLE): >60 ML/MIN/1.73 M^2
ESTIMATED AVG GLUCOSE: 88 MG/DL (ref 68–131)
GLUCOSE SERPL-MCNC: 117 MG/DL (ref 70–110)
HBA1C MFR BLD: 4.7 % (ref 4–5.6)
HCT VFR BLD AUTO: 39.1 % (ref 37–48.5)
HDLC SERPL-MCNC: 45 MG/DL (ref 40–75)
HDLC SERPL: 23.3 % (ref 20–50)
HGB BLD-MCNC: 12.5 G/DL (ref 12–16)
IMM GRANULOCYTES # BLD AUTO: 0.06 K/UL (ref 0–0.04)
IMM GRANULOCYTES NFR BLD AUTO: 0.4 % (ref 0–0.5)
LDLC SERPL CALC-MCNC: 94.2 MG/DL (ref 63–159)
LYMPHOCYTES # BLD AUTO: 3.5 K/UL (ref 1–4.8)
LYMPHOCYTES NFR BLD: 24.1 % (ref 18–48)
MCH RBC QN AUTO: 28.9 PG (ref 27–31)
MCHC RBC AUTO-ENTMCNC: 32 G/DL (ref 32–36)
MCV RBC AUTO: 91 FL (ref 82–98)
MONOCYTES # BLD AUTO: 1 K/UL (ref 0.3–1)
MONOCYTES NFR BLD: 6.9 % (ref 4–15)
NEUTROPHILS # BLD AUTO: 9.5 K/UL (ref 1.8–7.7)
NEUTROPHILS NFR BLD: 65.9 % (ref 38–73)
NONHDLC SERPL-MCNC: 148 MG/DL
NRBC BLD-RTO: 0 /100 WBC
PLATELET # BLD AUTO: 267 K/UL (ref 150–450)
PMV BLD AUTO: 11.6 FL (ref 9.2–12.9)
POTASSIUM SERPL-SCNC: 3.5 MMOL/L (ref 3.5–5.1)
PROT SERPL-MCNC: 7.1 G/DL (ref 6–8.4)
RBC # BLD AUTO: 4.32 M/UL (ref 4–5.4)
SODIUM SERPL-SCNC: 140 MMOL/L (ref 136–145)
T4 FREE SERPL-MCNC: 1 NG/DL (ref 0.71–1.51)
TRIGL SERPL-MCNC: 269 MG/DL (ref 30–150)
TSH SERPL DL<=0.005 MIU/L-ACNC: 2.04 UIU/ML (ref 0.4–4)
WBC # BLD AUTO: 14.42 K/UL (ref 3.9–12.7)

## 2025-03-09 LAB
C TRACH DNA SPEC QL NAA+PROBE: NOT DETECTED
N GONORRHOEA DNA SPEC QL NAA+PROBE: NOT DETECTED

## 2025-03-11 ENCOUNTER — RESULTS FOLLOW-UP (OUTPATIENT)
Dept: INTERNAL MEDICINE | Facility: CLINIC | Age: 25
End: 2025-03-11

## 2025-04-07 ENCOUNTER — PATIENT MESSAGE (OUTPATIENT)
Dept: INTERNAL MEDICINE | Facility: CLINIC | Age: 25
End: 2025-04-07

## 2025-04-07 ENCOUNTER — OFFICE VISIT (OUTPATIENT)
Dept: INTERNAL MEDICINE | Facility: CLINIC | Age: 25
End: 2025-04-07
Payer: COMMERCIAL

## 2025-04-07 DIAGNOSIS — D72.828 GRANULOCYTOSIS: Chronic | ICD-10-CM

## 2025-04-07 DIAGNOSIS — E88.810 METABOLIC SYNDROME: Chronic | ICD-10-CM

## 2025-04-07 DIAGNOSIS — E66.812 CLASS 2 SEVERE OBESITY DUE TO EXCESS CALORIES WITH SERIOUS COMORBIDITY AND BODY MASS INDEX (BMI) OF 37.0 TO 37.9 IN ADULT: Chronic | ICD-10-CM

## 2025-04-07 DIAGNOSIS — E78.1 HYPERTRIGLYCERIDEMIA: Chronic | ICD-10-CM

## 2025-04-07 DIAGNOSIS — R74.01 ELEVATED ALT MEASUREMENT: Chronic | ICD-10-CM

## 2025-04-07 DIAGNOSIS — F33.0 RECURRENT MILD MAJOR DEPRESSIVE DISORDER WITH ANXIETY: Chronic | ICD-10-CM

## 2025-04-07 DIAGNOSIS — F33.0 RECURRENT MILD MAJOR DEPRESSIVE DISORDER WITH ANXIETY: Primary | Chronic | ICD-10-CM

## 2025-04-07 DIAGNOSIS — J30.1 SEASONAL ALLERGIC RHINITIS DUE TO POLLEN: Chronic | ICD-10-CM

## 2025-04-07 DIAGNOSIS — F41.9 RECURRENT MILD MAJOR DEPRESSIVE DISORDER WITH ANXIETY: Chronic | ICD-10-CM

## 2025-04-07 DIAGNOSIS — F41.9 RECURRENT MILD MAJOR DEPRESSIVE DISORDER WITH ANXIETY: Primary | Chronic | ICD-10-CM

## 2025-04-07 DIAGNOSIS — E66.01 CLASS 2 SEVERE OBESITY DUE TO EXCESS CALORIES WITH SERIOUS COMORBIDITY AND BODY MASS INDEX (BMI) OF 37.0 TO 37.9 IN ADULT: Chronic | ICD-10-CM

## 2025-04-07 PROBLEM — E66.9 OBESITY (BMI 35.0-39.9 WITHOUT COMORBIDITY): Chronic | Status: ACTIVE | Noted: 2023-08-31

## 2025-04-07 RX ORDER — FLUTICASONE PROPIONATE 50 MCG
2 SPRAY, SUSPENSION (ML) NASAL DAILY
Qty: 32 G | Refills: 5 | Status: SHIPPED | OUTPATIENT
Start: 2025-04-07 | End: 2026-04-07

## 2025-04-07 RX ORDER — BUPROPION HYDROCHLORIDE 150 MG/1
150 TABLET ORAL DAILY
Qty: 30 TABLET | Refills: 2 | Status: SHIPPED | OUTPATIENT
Start: 2025-04-07

## 2025-04-07 NOTE — ASSESSMENT & PLAN NOTE
Lab Results   Component Value Date    WBC 14.42 (H) 03/07/2025    RBC 4.32 03/07/2025    HGB 12.5 03/07/2025    HCT 39.1 03/07/2025    MCV 91 03/07/2025    MCH 28.9 03/07/2025    MCHC 32.0 03/07/2025    RDW 12.9 03/07/2025     03/07/2025    MPV 11.6 03/07/2025    GRAN 9.5 (H) 03/07/2025    GRAN 65.9 03/07/2025    LYMPH 3.5 03/07/2025    LYMPH 24.1 03/07/2025    MONO 1.0 03/07/2025    MONO 6.9 03/07/2025    EOS 0.4 03/07/2025    BASO 0.04 03/07/2025    EOSINOPHIL 2.4 03/07/2025    BASOPHIL 0.3 03/07/2025      Lab Results   Component Value Date    WBC 14.42 (H) 03/07/2025    WBC 12.13 (H) 09/21/2023

## 2025-04-07 NOTE — ASSESSMENT & PLAN NOTE
"The diagnostic criteria for metabolic syndrome are met when a patient has at least three of the following five conditions:    [x] Abdominal Obesity: Waist circumference greater than 102 cm (40 inches) in men or greater than 88 cm (35 inches) in women; thresholds may vary based on ethnicity.  Estimated body mass index is 37.97 kg/m² as calculated from the following:    Height as of 3/7/25: 5' 11" (1.803 m).    Weight as of 3/7/25: 123.5 kg (272 lb 4.3 oz).    [x] Elevated Triglycerides: Triglycerides 150 mg/dL or higher, or on treatment for elevated triglycerides.  Lab Results   Component Value Date    TRIG 269 (H) 03/07/2025    TRIG 205 (H) 09/21/2023       [] Low HDL Cholesterol: HDL less than 40 mg/dL in men or less than 50 mg/dL in women, or on treatment for low HDL.  Lab Results   Component Value Date    HDL 45 03/07/2025    HDL 38 09/21/2023       [x] Elevated Blood Pressure: Blood pressure 130/85 mmHg or higher, or on antihypertensive medication.  BP Readings from Last 5 Encounters:   03/07/25 138/78   09/29/23 124/76   08/31/23 (!) 142/84        [x] Elevated Fasting Glucose: Fasting glucose 100 mg/dL or higher, or on medication for hyperglycemia.  Lab Results   Component Value Date    HGBA1C 4.7 03/07/2025    HGBA1C 4.6 09/21/2023     (H) 03/07/2025     No results found for: "GLUCFAST", "JVVT0YP", "KPRL1KA"  No results found for: "LABINSU"    "

## 2025-04-07 NOTE — PROGRESS NOTES
TELEMEDICINE VIRTUAL VIDEO VISIT  4/7/25  8:00 AM CDT    Visit Type: Audiovisual    Patient's Location: Radha represents that they are located within the state of [place].    CHIEF COMPLAINT: Establish care, review results, follow-up    Recurrent mild major depressive disorder with anxiety: She experiences both anxiety and depression, with anxiety being predominant. Recent events have intensified depressive symptoms, and she reports difficulty with morning awakening and low motivation. Current treatment includes sertraline, which has decreased in effectiveness, reflecting on her past experience with citalopram. Bupropion has been added to enhance management, and she is educated on the associated risks and side effects of these medications. Non-pharmacologic recommendations include daily exercise, sunlight exposure, and stress management techniques. She is to follow up in 1.5 months to assess mental health medication efficacy.    Seasonal allergic rhinitis due to pollen: She reports chronic sore throat and sinus issues, linked to her history of pollen allergies. Symptoms are partially alleviated with daily antihistamines. Management includes initiating fluticasone propionate nasal spray and educating her on the importance of its regular use. Continued antihistamine and Flonase use is recommended for at least 2 weeks, and only Flonase may be considered during pollen season.    Class 2 severe obesity due to excess calories: Her BMI is 37.97 kg/m², reflecting class 2 obesity. Recent weight readings indicate an upward trend. Addressing this includes considering weight loss medications and encouraging lifestyle modifications such as reducing calorie intake and increasing physical activity. She is advised to check insurance coverage for weight loss medications and review obesity treatment options provided via the patient portal.    Hypertriglyceridemia: Lab results show elevated triglycerides, potentially exacerbated by  genetic factors and non-fasting state at testing. The management plan focuses on lifestyle modifications such as weight loss, aerobic exercise, reducing sugar intake, and optimizing blood sugar control. It's considered as part of addressing metabolic syndrome.    Metabolic syndrome: Diagnosed based on abdominal obesity, elevated triglycerides, higher blood pressure, and fasting glucose level. Weight management, dietary changes, and physical activity are emphasized. Monitoring includes managing related conditions like hypertriglyceridemia and considering future weight loss medication.    Elevated ALT measurement: Slightly elevated liver enzyme observed, potentially indicating fatty liver associated with metabolic syndrome. Monitoring liver function through future comprehensive panels is indicated, alongside addressing metabolic syndrome with lifestyle adjustments.    Granulocytosis: Elevated WBC and granulocyte levels noted, possibly linked to chronic sore throat and sinus issues. Monitoring includes a scheduled re-evaluation through laboratory testing, including lactate dehydrogenase, C-reactive protein, sedimentation rate, and CBC auto differential in 1-2 months.      1. Granulocytosis  Assessment & Plan:  Lab Results   Component Value Date    WBC 14.42 (H) 03/07/2025    RBC 4.32 03/07/2025    HGB 12.5 03/07/2025    HCT 39.1 03/07/2025    MCV 91 03/07/2025    MCH 28.9 03/07/2025    MCHC 32.0 03/07/2025    RDW 12.9 03/07/2025     03/07/2025    MPV 11.6 03/07/2025    GRAN 9.5 (H) 03/07/2025    GRAN 65.9 03/07/2025    LYMPH 3.5 03/07/2025    LYMPH 24.1 03/07/2025    MONO 1.0 03/07/2025    MONO 6.9 03/07/2025    EOS 0.4 03/07/2025    BASO 0.04 03/07/2025    EOSINOPHIL 2.4 03/07/2025    BASOPHIL 0.3 03/07/2025      Lab Results   Component Value Date    WBC 14.42 (H) 03/07/2025    WBC 12.13 (H) 09/21/2023       Orders:  -     Lactate Dehydrogenase; Future; Expected date: 04/07/2025  -     C-Reactive Protein;  Future; Expected date: 04/07/2025  -     Sedimentation rate; Future; Expected date: 04/07/2025  -     CBC Auto Differential; Future; Expected date: 04/07/2025    2. Elevated ALT measurement    3. Hypertriglyceridemia  Overview:  Encourage weight loss by least 5% and to increase aerobic exercise and resistance training  Limit simple sugars and simple carbohydrate intake-focus on low glycemic foods  Optimize blood sugar control  The ASCVD Risk score (Elijah MCCOY, et al., 2019) failed to calculate for the following reasons:    The 2019 ASCVD risk score is only valid for ages 40 to 79             4. Seasonal allergic rhinitis due to pollen  -     fluticasone propionate (FLONASE) 50 mcg/actuation nasal spray; 2 sprays (100 mcg total) by Each Nostril route once daily.  Dispense: 32 g; Refill: 5    5. Recurrent mild major depressive disorder with anxiety  Overview:  Previously Celexa did not improve symptoms  Continue Zoloft 50mg qd  Practice deep breathing or abdominal breathing exercises when anxiety occurs.  Exercise daily. Get sunlight daily.  Avoid caffeine, alcohol and stimulants.  Practice positive phrases and repeat throughout the day, along with yoga and relaxation techniques.  Set healthy boundaries, avoid people and conversations that increase stress.  Educated patient on the risks of serotonin based medications such as serotonin modulators and SSRIs/SNRIs including common side effects of nausea, GI upset, headache dizziness as well as the rare risk for worsening symptoms of depression including development of suicidal thoughts or ideations, and serotonin syndrome.   Discussed benefits of medication not becoming noticeable until up to 6 weeks from start date.   Report any symptoms of suicidal or homicidal ideations immediately, if clinic is closed go to nearest emergency room.        Orders:  -     buPROPion (WELLBUTRIN XL) 150 MG TB24 tablet; Take 1 tablet (150 mg total) by mouth once daily.  Dispense: 30 tablet;  "Refill: 2    6. Metabolic syndrome  Assessment & Plan:  The diagnostic criteria for metabolic syndrome are met when a patient has at least three of the following five conditions:    [x] Abdominal Obesity: Waist circumference greater than 102 cm (40 inches) in men or greater than 88 cm (35 inches) in women; thresholds may vary based on ethnicity.  Estimated body mass index is 37.97 kg/m² as calculated from the following:    Height as of 3/7/25: 5' 11" (1.803 m).    Weight as of 3/7/25: 123.5 kg (272 lb 4.3 oz).    [x] Elevated Triglycerides: Triglycerides 150 mg/dL or higher, or on treatment for elevated triglycerides.  Lab Results   Component Value Date    TRIG 269 (H) 03/07/2025    TRIG 205 (H) 09/21/2023       [] Low HDL Cholesterol: HDL less than 40 mg/dL in men or less than 50 mg/dL in women, or on treatment for low HDL.  Lab Results   Component Value Date    HDL 45 03/07/2025    HDL 38 09/21/2023       [x] Elevated Blood Pressure: Blood pressure 130/85 mmHg or higher, or on antihypertensive medication.  BP Readings from Last 5 Encounters:   03/07/25 138/78   09/29/23 124/76   08/31/23 (!) 142/84        [x] Elevated Fasting Glucose: Fasting glucose 100 mg/dL or higher, or on medication for hyperglycemia.  Lab Results   Component Value Date    HGBA1C 4.7 03/07/2025    HGBA1C 4.6 09/21/2023     (H) 03/07/2025     No results found for: "GLUCFAST", "OQBG7QD", "IICS1KU"  No results found for: "LABINSU"           Unless specified otherwise, chronic conditions are represented as and appear to be compensated/controlled and stable.  Today's visit involved the intricate management of episodic problem(s) and the ongoing care for the patient's serious or complex condition(s) listed above, reflecting the inherent complexity of providing longitudinal, comprehensive evaluation and management as the central hub for the patient's primary care services.  Any education and instructions provided to the patient via Patient " Portal Message today are incorporated herein by reference.  There were no vitals filed for this visit.  PHYSICAL EXAM:  GENERAL APPEARANCE:  - Alert and grossly oriented.  - No apparent distress, breathing comfortably.     EYES:  - Sclera without icterus.     EARS, NOSE, AND THROAT:  - No visible abnormalities.     RESPIRATORY:  - No respiratory distress.  - No audible wheezing or cough.     PSYCHIATRIC:  - Mood and affect appropriate; behavior cooperative.  Review of Systems   Constitutional:  Negative for activity change and unexpected weight change.   HENT:  Negative for hearing loss, rhinorrhea and trouble swallowing.    Eyes:  Negative for discharge and visual disturbance.   Respiratory:  Negative for chest tightness and wheezing.    Cardiovascular:  Negative for chest pain and palpitations.   Gastrointestinal:  Negative for blood in stool, constipation, diarrhea and vomiting.   Endocrine: Negative for polydipsia and polyuria.   Genitourinary:  Negative for difficulty urinating, dysuria, hematuria and menstrual problem.   Musculoskeletal:  Negative for arthralgias, joint swelling and neck pain.   Neurological:  Negative for weakness and headaches.   Psychiatric/Behavioral:  Negative for confusion and dysphoric mood.        I spent a total of 32 minutes today evaluating and managing this patient for this encounter.  This includes face to face time and non-face to face time preparing to see the patient (eg, review of tests), obtaining and/or reviewing separately obtained history, documenting clinical information in the electronic or other health record, independently interpreting results and communicating results to the patient/family/caregiver, or care coordinator.  This time was exclusive of any separately billable procedures for this patient and exclusive of time spent treating any other patient.    Some sections of this note may have been produced using ambient-listening and speech-recognition technologies. I  have reviewed the content for accuracy, though minor errors in syntax, spelling, or similar-sounding words may be present and should be understood in context. Please contact the author for any clarification.  Each patient to whom medical services are provided by telemedicine is: (1) informed of the relationship between the physician and patient and the respective role of any other health care provider with respect to management of the patient; and (2) notified that he or she may decline to receive medical services by telemedicine and may withdraw from such care at any time.    Follow up in about 1 year (around 4/7/2026) for annual exam.

## 2025-04-07 NOTE — ASSESSMENT & PLAN NOTE
"Wt Readings from Last 6 Encounters:   03/07/25 123.5 kg (272 lb 4.3 oz)   09/29/23 119.6 kg (263 lb 9.6 oz)   08/31/23 120.2 kg (265 lb)     Estimated body mass index is 37.97 kg/m² as calculated from the following:    Height as of 3/7/25: 5' 11" (1.803 m).    Weight as of 3/7/25: 123.5 kg (272 lb 4.3 oz).    "

## 2025-05-02 ENCOUNTER — LAB VISIT (OUTPATIENT)
Dept: LAB | Facility: HOSPITAL | Age: 25
End: 2025-05-02
Attending: FAMILY MEDICINE
Payer: COMMERCIAL

## 2025-05-02 DIAGNOSIS — D72.828 GRANULOCYTOSIS: ICD-10-CM

## 2025-05-02 DIAGNOSIS — Z00.00 WELLNESS EXAMINATION: ICD-10-CM

## 2025-05-02 LAB
ABSOLUTE EOSINOPHIL (OHS): 0.38 K/UL
ABSOLUTE MONOCYTE (OHS): 0.88 K/UL (ref 0.3–1)
ABSOLUTE NEUTROPHIL COUNT (OHS): 10.59 K/UL (ref 1.8–7.7)
ALBUMIN SERPL BCP-MCNC: 3.6 G/DL (ref 3.5–5.2)
ALP SERPL-CCNC: 93 UNIT/L (ref 40–150)
ALT SERPL W/O P-5'-P-CCNC: 37 UNIT/L (ref 10–44)
ANION GAP (OHS): 7 MMOL/L (ref 8–16)
AST SERPL-CCNC: 45 UNIT/L (ref 11–45)
BASOPHILS # BLD AUTO: 0.04 K/UL
BASOPHILS NFR BLD AUTO: 0.3 %
BILIRUB SERPL-MCNC: 1 MG/DL (ref 0.1–1)
BUN SERPL-MCNC: 8 MG/DL (ref 6–20)
CALCIUM SERPL-MCNC: 9.2 MG/DL (ref 8.7–10.5)
CHLORIDE SERPL-SCNC: 103 MMOL/L (ref 95–110)
CHOLEST SERPL-MCNC: 185 MG/DL (ref 120–199)
CHOLEST/HDLC SERPL: 3.8 {RATIO} (ref 2–5)
CO2 SERPL-SCNC: 29 MMOL/L (ref 23–29)
CREAT SERPL-MCNC: 0.6 MG/DL (ref 0.5–1.4)
CRP SERPL-MCNC: 32.5 MG/L
ERYTHROCYTE [DISTWIDTH] IN BLOOD BY AUTOMATED COUNT: 13 % (ref 11.5–14.5)
ERYTHROCYTE [SEDIMENTATION RATE] IN BLOOD BY PHOTOMETRIC METHOD: 17 MM/HR
GFR SERPLBLD CREATININE-BSD FMLA CKD-EPI: >60 ML/MIN/1.73/M2
GLUCOSE SERPL-MCNC: 72 MG/DL (ref 70–110)
HCT VFR BLD AUTO: 39.9 % (ref 37–48.5)
HDLC SERPL-MCNC: 49 MG/DL (ref 40–75)
HDLC SERPL: 26.5 % (ref 20–50)
HGB BLD-MCNC: 12.6 GM/DL (ref 12–16)
IMM GRANULOCYTES # BLD AUTO: 0.06 K/UL (ref 0–0.04)
IMM GRANULOCYTES NFR BLD AUTO: 0.4 % (ref 0–0.5)
LDH SERPL-CCNC: 152 U/L (ref 110–260)
LDLC SERPL CALC-MCNC: 108.2 MG/DL (ref 63–159)
LYMPHOCYTES # BLD AUTO: 3.38 K/UL (ref 1–4.8)
MCH RBC QN AUTO: 28.6 PG (ref 27–31)
MCHC RBC AUTO-ENTMCNC: 31.6 G/DL (ref 32–36)
MCV RBC AUTO: 91 FL (ref 82–98)
NONHDLC SERPL-MCNC: 136 MG/DL
NUCLEATED RBC (/100WBC) (OHS): 0 /100 WBC
PLATELET # BLD AUTO: 248 K/UL (ref 150–450)
PMV BLD AUTO: 11.2 FL (ref 9.2–12.9)
POTASSIUM SERPL-SCNC: 4 MMOL/L (ref 3.5–5.1)
PROT SERPL-MCNC: 7.4 GM/DL (ref 6–8.4)
RBC # BLD AUTO: 4.41 M/UL (ref 4–5.4)
RELATIVE EOSINOPHIL (OHS): 2.5 %
RELATIVE LYMPHOCYTE (OHS): 22 % (ref 18–48)
RELATIVE MONOCYTE (OHS): 5.7 % (ref 4–15)
RELATIVE NEUTROPHIL (OHS): 69.1 % (ref 38–73)
SODIUM SERPL-SCNC: 139 MMOL/L (ref 136–145)
TRIGL SERPL-MCNC: 139 MG/DL (ref 30–150)
WBC # BLD AUTO: 15.33 K/UL (ref 3.9–12.7)

## 2025-05-02 PROCEDURE — 85025 COMPLETE CBC W/AUTO DIFF WBC: CPT

## 2025-05-02 PROCEDURE — 85652 RBC SED RATE AUTOMATED: CPT

## 2025-05-02 PROCEDURE — 80061 LIPID PANEL: CPT

## 2025-05-02 PROCEDURE — 36415 COLL VENOUS BLD VENIPUNCTURE: CPT

## 2025-05-02 PROCEDURE — 86140 C-REACTIVE PROTEIN: CPT

## 2025-05-02 PROCEDURE — 83615 LACTATE (LD) (LDH) ENZYME: CPT

## 2025-05-02 PROCEDURE — 80053 COMPREHEN METABOLIC PANEL: CPT

## 2025-05-03 ENCOUNTER — RESULTS FOLLOW-UP (OUTPATIENT)
Dept: INTERNAL MEDICINE | Facility: CLINIC | Age: 25
End: 2025-05-03
Payer: COMMERCIAL

## 2025-05-03 DIAGNOSIS — R79.82 ELEVATED C-REACTIVE PROTEIN (CRP): ICD-10-CM

## 2025-05-03 DIAGNOSIS — D72.828 GRANULOCYTOSIS: Primary | Chronic | ICD-10-CM

## 2025-05-03 NOTE — PROGRESS NOTES
Dear Radha,    I reviewed your recent test results, and for the most part, everything looks good. However, there are a couple of things worth noting that I want to explain.    Your complete blood count (CBC) showed that your white blood cell (WBC) count is still elevated. White blood cells are part of your immune system, and a high count can be a sign of inflammation or that your body is reacting to something--such as an infection, stress, or, in some cases, other underlying conditions. Since this finding has persisted, its something we shouldnt ignore.    Additionally, your C-reactive protein (CRP) level is also elevated. CRP is another marker of inflammation in the body. It doesnt tell us exactly where the inflammation is coming from, but it does confirm that your immune system is responding to something.    To help us better understand what might be going on, Mello requested a hematology e-consult. An e-consult is when a specialist reviews your history and lab results and then gives me their expert opinion on whether further testing or treatment is needed. This is a faster and more affordable option than a traditional in- visit. Most patients dont have to pay anything out-of-pocket for an e-consult, though this can depend on your insurance plan.    Unless I hear otherwise from you, Ill go ahead with the e-consult and let you know what we learn as soon as I receive the hematologists recommendations.    Best regards,    Dr. RG

## 2025-05-03 NOTE — TELEPHONE ENCOUNTER
Request for Hematology-Oncology E-Consult     Patient Name: Radha Godinez  (24 y.o.)  MRN: [id number]  Requesting Provider: BECKY Foy MD   Primary Care Provider: BECKY Foy MD     Consent for e-consult has been obtained from patient, and patient is aware of applicable cost: Yes    Reason for Consult: Chronic granulocytosis.    Lab Results   Component Value Date    WBC 15.33 (H) 05/02/2025    RBC 4.41 05/02/2025    HGB 12.6 05/02/2025    HCT 39.9 05/02/2025    MCV 91 05/02/2025    MCH 28.6 05/02/2025    MCHC 31.6 (L) 05/02/2025    RDW 13.0 05/02/2025     05/02/2025    MPV 11.2 05/02/2025    GRAN 9.5 (H) 03/07/2025    GRAN 65.9 03/07/2025    LYMPH 22.0 05/02/2025    LYMPH 3.38 05/02/2025    MONO 5.7 05/02/2025    MONO 0.88 05/02/2025    EOS 2.5 05/02/2025    EOS 0.38 05/02/2025    BASO 0.04 03/07/2025    EOSINOPHIL 2.4 03/07/2025    BASOPHIL 0.3 05/02/2025    BASOPHIL 0.04 05/02/2025      Lab Results   Component Value Date    WBC 15.33 (H) 05/02/2025    WBC 14.42 (H) 03/07/2025    WBC 12.13 (H) 09/21/2023     Lab Results   Component Value Date    CRP 32.5 (H) 05/02/2025    SEDRATE 17 05/02/2025       SPECIFIC QUESTIONS: Next step in E&M?    Total time spent preparing for this E-consult and/or communicating with the consulting physician was greater than or equal to: 18 minutes. This time was spent personally by me on the following activities: review of patient's past medical history, review of any interval history, review of current medication list and medication history, review of problem list, review of my last progress note, review of lab results, communicating message to patient, and entering information for E-consult order. This time was exclusive of any separately billable procedures or separate E&M services for this patient and exclusive of time spent treating any other patients.

## 2025-05-05 ENCOUNTER — E-CONSULT (OUTPATIENT)
Dept: HEMATOLOGY/ONCOLOGY | Facility: CLINIC | Age: 25
End: 2025-05-05
Payer: COMMERCIAL

## 2025-05-05 DIAGNOSIS — D72.829 LEUKOCYTOSIS, UNSPECIFIED TYPE: Primary | ICD-10-CM

## 2025-05-05 NOTE — CONSULTS
[facility] - Hematology [facility]  Response for E-Consult     Patient Name: Radha Godinez  MRN: [id number]  Primary Care Provider: BECKY Foy MD   Requesting Provider: BECKY Foy MD  Consults    Recommendation: Thank you for the e-consult.  CBCs reviewed. Mild, neutrophil dominant, progressive leukocytosis.  Would check a couple labs to help assess for presence of any clonality - check BCRABL RNA Diagnostic with Reflex ([id number]) and MPN Panel on blood ([id number]).    Additional future steps to consider: If either mutation test returns with a positive result, would refer to hematology. If both are normal, than WBC changes are most likely reactive and continue to observe.    Total time of Consultation: 5 minute    I did not speak to the requesting provider verbally about this.     *This eConsult is based on the clinical data available to me and is furnished without benefit of a physical examination. The eConsult will need to be interpreted in light of any clinical issues or changes in patient status not available to me at the time of filing this eConsults. Significant changes in patient condition or level of acuity should result in immediate formal consultation and reevaluation. Please alert me if you have further questions.    Thank you for this eConsult referral.     Lori Alvarez MD  [facility] - Hematology [facility]

## 2025-05-10 ENCOUNTER — PATIENT MESSAGE (OUTPATIENT)
Dept: INTERNAL MEDICINE | Facility: CLINIC | Age: 25
End: 2025-05-10
Payer: COMMERCIAL

## 2025-05-10 DIAGNOSIS — D72.828 NEUTROPHILIA: Primary | ICD-10-CM

## 2025-05-15 ENCOUNTER — PATIENT MESSAGE (OUTPATIENT)
Dept: INTERNAL MEDICINE | Facility: CLINIC | Age: 25
End: 2025-05-15
Payer: COMMERCIAL

## 2025-05-23 ENCOUNTER — PATIENT MESSAGE (OUTPATIENT)
Dept: INTERNAL MEDICINE | Facility: CLINIC | Age: 25
End: 2025-05-23

## 2025-05-23 ENCOUNTER — OFFICE VISIT (OUTPATIENT)
Dept: INTERNAL MEDICINE | Facility: CLINIC | Age: 25
End: 2025-05-23
Payer: COMMERCIAL

## 2025-05-23 DIAGNOSIS — E66.812 CLASS 2 SEVERE OBESITY DUE TO EXCESS CALORIES WITH SERIOUS COMORBIDITY AND BODY MASS INDEX (BMI) OF 37.0 TO 37.9 IN ADULT: Primary | Chronic | ICD-10-CM

## 2025-05-23 DIAGNOSIS — F33.0 RECURRENT MILD MAJOR DEPRESSIVE DISORDER WITH ANXIETY: Chronic | ICD-10-CM

## 2025-05-23 DIAGNOSIS — F41.9 RECURRENT MILD MAJOR DEPRESSIVE DISORDER WITH ANXIETY: Chronic | ICD-10-CM

## 2025-05-23 DIAGNOSIS — D72.828 NEUTROPHILIA: Chronic | ICD-10-CM

## 2025-05-23 DIAGNOSIS — E66.01 CLASS 2 SEVERE OBESITY DUE TO EXCESS CALORIES WITH SERIOUS COMORBIDITY AND BODY MASS INDEX (BMI) OF 37.0 TO 37.9 IN ADULT: Primary | Chronic | ICD-10-CM

## 2025-05-23 RX ORDER — TIRZEPATIDE 5 MG/.5ML
5 INJECTION, SOLUTION SUBCUTANEOUS
Qty: 2 ML | Refills: 1 | Status: SHIPPED | OUTPATIENT
Start: 2025-06-16 | End: 2025-05-23 | Stop reason: SDUPTHER

## 2025-05-23 RX ORDER — SERTRALINE HYDROCHLORIDE 50 MG/1
50 TABLET, FILM COATED ORAL DAILY
Qty: 90 TABLET | Refills: 1 | Status: SHIPPED | OUTPATIENT
Start: 2025-05-23 | End: 2025-05-23 | Stop reason: SDUPTHER

## 2025-05-23 RX ORDER — BUPROPION HYDROCHLORIDE 150 MG/1
150 TABLET ORAL DAILY
Qty: 90 TABLET | Refills: 1 | Status: SHIPPED | OUTPATIENT
Start: 2025-05-23

## 2025-05-23 RX ORDER — SERTRALINE HYDROCHLORIDE 50 MG/1
50 TABLET, FILM COATED ORAL DAILY
Qty: 90 TABLET | Refills: 1 | Status: SHIPPED | OUTPATIENT
Start: 2025-05-23

## 2025-05-23 RX ORDER — TIRZEPATIDE 2.5 MG/.5ML
2.5 INJECTION, SOLUTION SUBCUTANEOUS
Qty: 2 ML | Refills: 0 | Status: SHIPPED | OUTPATIENT
Start: 2025-05-23 | End: 2025-05-23 | Stop reason: SDUPTHER

## 2025-05-23 RX ORDER — TIRZEPATIDE 2.5 MG/.5ML
2.5 INJECTION, SOLUTION SUBCUTANEOUS
Qty: 2 ML | Refills: 0 | Status: SHIPPED | OUTPATIENT
Start: 2025-05-23

## 2025-05-23 RX ORDER — TIRZEPATIDE 5 MG/.5ML
5 INJECTION, SOLUTION SUBCUTANEOUS
Qty: 2 ML | Refills: 1 | Status: SHIPPED | OUTPATIENT
Start: 2025-06-16

## 2025-05-23 RX ORDER — BUPROPION HYDROCHLORIDE 150 MG/1
150 TABLET ORAL DAILY
Qty: 90 TABLET | Refills: 1 | Status: SHIPPED | OUTPATIENT
Start: 2025-05-23 | End: 2025-05-23 | Stop reason: SDUPTHER

## 2025-05-23 NOTE — PROGRESS NOTES
TELEMEDICINE VIRTUAL VIDEO VISIT  5/23/25  2:40 PM CDT    Visit Type: Audiovisual    Patient's Location: Radha represents that they are located within the state of [place].    CHIEF COMPLAINT: Follow-up    Class 2 severe obesity due to excess calories with serious comorbidity and body mass index (BMI) of 37.0 to 37.9 in adult: She reported a current weight of 270 lbs with stable weight over recent measurements. The calculated BMI as of 5/6/25 was 37.66 kg/m2, and the weight readings have remained in a narrow range since September 2023. Vital signs recorded during recent visits showed blood pressure mostly within a normal range, and other routine laboratory parameters remain within expected limits for this diagnosis. She was prescribed tirzepatide (Zepbound) with instructions to begin with 2.5 mg subcutaneously weekly and transition to 5 mg, following standard titration. A referral was made to Nutrition Services for ambulatory consult. She was provided extensive education regarding tirzepatide, including expected effects, dosing, dietary recommendations, potential side effects, and insurance considerations for drug coverage. She was instructed to follow a specific diet plan to enhance weight loss and minimize side effects and to attend a nutritionist appointment once scheduled. Options for compounded tirzepatide were discussed if insurance does not approve Zepbound. She will follow up in 11 weeks to evaluate treatment response and dosage adjustment.    Recurrent mild major depressive disorder with anxiety: She described significant improvement in both her depression and anxiety on the current regimen of bupropion 150 mg daily and sertraline 50 mg daily, with greater ability to manage daily activities and no desire to adjust dosing. She reported satisfaction with current medication effects. She was counseled on lifestyle modifications including regular physical activity, daily exercise, exposure to sunlight,  avoidance of caffeine, alcohol, and stimulants, use of deep breathing, positive affirmation, and establishing healthy boundaries. Education on the delayed onset of efficacy and potential risks of serotonergic medications was reinforced, including triggers for urgent intervention. Medications continued include bupropion (Wellbutrin XL) 150 mg daily and sertraline (Zoloft) 50 mg daily.    Neutrophilia with granulocytosis: She demonstrated persistent neutrophilia and granulocytosis on serial laboratory reviews, with white blood cell counts of 15.33 x 10^3/µL on 5/2/25, 14.42 x 10^3/µL on 3/7/25, and 12.13 x 10^3/µL on 9/21/23. Hematology E-consult recommended further evaluation for clonality due to sustained elevation. She reported missing a prior laboratory visit for further workup due to a work conflict. I placed orders for MPN (JAK2 V617F with reflex to CALR, MPL) and BCR/ABL, RNA with reflex testing. Instructions were given to schedule and complete these hematology laboratory tests to rule out myeloproliferative neoplasm or other clonal causes. If clonal mutations are present, a hematology referral will be made. If studies are negative, ongoing monitoring for reactive leukocytosis will be performed. She was counseled on the importance of timely completion of labs for early detection.    Follow-up instructions: She was advised to schedule and complete the hematology laboratory orders and to attend the nutritionist appointment as soon as scheduled. She was directed to follow diet and medication instructions as discussed, review educational materials about tirzepatide, and review available patient savings programs or compounded medication options if there are issues with insurance coverage or medication affordability. A virtual follow-up with me is planned in approximately 11 weeks to assess progress and make any necessary adjustments to her treatment plan.    1. Class 2 severe obesity due to excess calories with  "serious comorbidity and body mass index (BMI) of 37.0 to 37.9 in adult  Assessment & Plan:  Wt Readings from Last 6 Encounters:   05/06/25 122.5 kg (270 lb)   03/07/25 123.5 kg (272 lb 4.3 oz)   09/29/23 119.6 kg (263 lb 9.6 oz)   08/31/23 120.2 kg (265 lb)     Estimated body mass index is 37.66 kg/m² as calculated from the following:    Height as of 5/6/25: 5' 11" (1.803 m).    Weight as of 5/6/25: 122.5 kg (270 lb).      Orders:  -     Discontinue: tirzepatide, weight loss, (ZEPBOUND) 5 mg/0.5 mL PnIj; Inject 5 mg into the skin every 7 days.  Dispense: 2 mL; Refill: 1  -     Discontinue: tirzepatide, weight loss, (ZEPBOUND) 2.5 mg/0.5 mL PnIj; Inject 2.5 mg into the skin every 7 days.  Dispense: 2 mL; Refill: 0  -     Ambulatory referral/consult to Nutrition Services; Future; Expected date: 05/30/2025  -     tirzepatide, weight loss, (ZEPBOUND) 2.5 mg/0.5 mL PnIj; Inject 2.5 mg into the skin every 7 days.  Dispense: 2 mL; Refill: 0  -     tirzepatide, weight loss, (ZEPBOUND) 5 mg/0.5 mL PnIj; Inject 5 mg into the skin every 7 days.  Dispense: 2 mL; Refill: 1    2. Recurrent mild major depressive disorder with anxiety  Overview:  Previously Celexa did not improve symptoms  Continue Zoloft 50mg qd  Practice deep breathing or abdominal breathing exercises when anxiety occurs.  Exercise daily. Get sunlight daily.  Avoid caffeine, alcohol and stimulants.  Practice positive phrases and repeat throughout the day, along with yoga and relaxation techniques.  Set healthy boundaries, avoid people and conversations that increase stress.  Educated patient on the risks of serotonin based medications such as serotonin modulators and SSRIs/SNRIs including common side effects of nausea, GI upset, headache dizziness as well as the rare risk for worsening symptoms of depression including development of suicidal thoughts or ideations, and serotonin syndrome.   Discussed benefits of medication not becoming noticeable until up to 6 " weeks from start date.   Report any symptoms of suicidal or homicidal ideations immediately, if clinic is closed go to nearest emergency room.        Orders:  -     Discontinue: buPROPion (WELLBUTRIN XL) 150 MG TB24 tablet; Take 1 tablet (150 mg total) by mouth once daily.  Dispense: 90 tablet; Refill: 1  -     Discontinue: sertraline (ZOLOFT) 50 MG tablet; Take 1 tablet (50 mg total) by mouth once daily.  Dispense: 90 tablet; Refill: 1  -     buPROPion (WELLBUTRIN XL) 150 MG TB24 tablet; Take 1 tablet (150 mg total) by mouth once daily.  Dispense: 90 tablet; Refill: 1  -     sertraline (ZOLOFT) 50 MG tablet; Take 1 tablet (50 mg total) by mouth once daily.  Dispense: 90 tablet; Refill: 1    3. Neutrophilia  Overview:  Hematology E-Consult Recommendation: Recommend labs: BCR-ABL RNA dx w/ reflex ([id number]) and peripheral blood MPN panel ([id number]) to assess for clonality. If either mutation positive, refer to hematology. If both normal, likely reactive leukocytosis--continue monitoring.    Orders:  -     MPN (JAK2 V617F)WITH REFLEX TO CALR,MPL; Future; Expected date: 05/23/2025  -     BCR/ABL, RNA-Qual, Diagnostic w/Reflex -Blood; Future; Expected date: 05/23/2025       Unless specified otherwise, chronic conditions are represented as and appear to be compensated/controlled and stable.  Today's visit involved the intricate management of episodic problem(s) and the ongoing care for the patient's serious or complex condition(s) listed above, reflecting the inherent complexity of providing longitudinal, comprehensive evaluation and management as the central hub for the patient's primary care services.  Any education and instructions provided to the patient via Patient Portal Message today are incorporated herein by reference.  There were no vitals filed for this visit.  PHYSICAL EXAM:  GENERAL APPEARANCE:  - Alert and grossly oriented.  - No apparent distress, breathing comfortably.     EYES:  - Sclera without icterus.      EARS, NOSE, AND THROAT:  - No visible abnormalities.     RESPIRATORY:  - No respiratory distress.  - No audible wheezing or cough.     PSYCHIATRIC:  - Mood and affect appropriate; behavior cooperative.  Review of Systems   Constitutional:  Negative for activity change and unexpected weight change.   HENT:  Negative for hearing loss, rhinorrhea and trouble swallowing.    Eyes:  Negative for discharge and visual disturbance.   Respiratory:  Negative for chest tightness and wheezing.    Cardiovascular:  Negative for chest pain and palpitations.   Gastrointestinal:  Negative for blood in stool, constipation, diarrhea and vomiting.   Endocrine: Negative for polydipsia and polyuria.   Genitourinary:  Negative for difficulty urinating, dysuria, hematuria and menstrual problem.   Musculoskeletal:  Negative for arthralgias, joint swelling and neck pain.   Neurological:  Negative for weakness and headaches.   Psychiatric/Behavioral:  Negative for confusion and dysphoric mood.      I spent a total of 42 minutes today evaluating and managing this patient for this encounter.  This includes face to face time and non-face to face time preparing to see the patient (eg, review of tests), obtaining and/or reviewing separately obtained history, documenting clinical information in the electronic or other health record, independently interpreting results and communicating results to the patient/family/caregiver, or care coordinator.  This time was exclusive of any separately billable procedures for this patient and exclusive of time spent treating any other patient.    Sections of this note may have been produced using ambient-listening and speech-recognition technologies. I have reviewed the content for accuracy, though errors in syntax, spelling, or similar-sounding words may be present and should be interpreted in context. Please contact the author for any clarification.    Each patient to whom medical services are provided by  telemedicine is: (1) informed of the relationship between the physician and patient and the respective role of any other health care provider with respect to management of the patient; and (2) notified that he or she may decline to receive medical services by telemedicine and may withdraw from such care at any time.    Follow up in about 11 weeks (around 8/8/2025) for virtual visit, w/ me.

## 2025-05-23 NOTE — ASSESSMENT & PLAN NOTE
"Wt Readings from Last 6 Encounters:   05/06/25 122.5 kg (270 lb)   03/07/25 123.5 kg (272 lb 4.3 oz)   09/29/23 119.6 kg (263 lb 9.6 oz)   08/31/23 120.2 kg (265 lb)     Estimated body mass index is 37.66 kg/m² as calculated from the following:    Height as of 5/6/25: 5' 11" (1.803 m).    Weight as of 5/6/25: 122.5 kg (270 lb).    "

## 2025-05-26 ENCOUNTER — DOCUMENTATION ONLY (OUTPATIENT)
Dept: PHARMACY | Facility: CLINIC | Age: 25
End: 2025-05-26
Payer: COMMERCIAL

## 2025-05-27 ENCOUNTER — TELEPHONE (OUTPATIENT)
Dept: INTERNAL MEDICINE | Facility: CLINIC | Age: 25
End: 2025-05-27
Payer: COMMERCIAL

## 2025-05-30 ENCOUNTER — PATIENT MESSAGE (OUTPATIENT)
Dept: INTERNAL MEDICINE | Facility: CLINIC | Age: 25
End: 2025-05-30

## 2025-05-30 ENCOUNTER — NUTRITION (OUTPATIENT)
Dept: INTERNAL MEDICINE | Facility: CLINIC | Age: 25
End: 2025-05-30
Payer: COMMERCIAL

## 2025-05-30 ENCOUNTER — LAB VISIT (OUTPATIENT)
Dept: LAB | Facility: HOSPITAL | Age: 25
End: 2025-05-30
Attending: FAMILY MEDICINE
Payer: COMMERCIAL

## 2025-05-30 ENCOUNTER — PATIENT MESSAGE (OUTPATIENT)
Dept: INTERNAL MEDICINE | Facility: CLINIC | Age: 25
End: 2025-05-30
Payer: COMMERCIAL

## 2025-05-30 DIAGNOSIS — E66.812 CLASS 2 SEVERE OBESITY DUE TO EXCESS CALORIES WITH SERIOUS COMORBIDITY AND BODY MASS INDEX (BMI) OF 37.0 TO 37.9 IN ADULT: Chronic | ICD-10-CM

## 2025-05-30 DIAGNOSIS — E66.01 CLASS 2 SEVERE OBESITY DUE TO EXCESS CALORIES WITH SERIOUS COMORBIDITY AND BODY MASS INDEX (BMI) OF 37.0 TO 37.9 IN ADULT: Chronic | ICD-10-CM

## 2025-05-30 DIAGNOSIS — D72.828 NEUTROPHILIA: Chronic | ICD-10-CM

## 2025-05-30 PROCEDURE — 81206 BCR/ABL1 GENE MAJOR BP: CPT

## 2025-05-30 PROCEDURE — 36415 COLL VENOUS BLD VENIPUNCTURE: CPT

## 2025-05-30 PROCEDURE — 81270 JAK2 GENE: CPT

## 2025-05-30 NOTE — PROGRESS NOTES
"Nutrition Assessment  Session Time:  60 minutes      Client name:  Radha Godinez  :  2000  Age:  24 y.o.  Gender:  female    Client states:  referred by JAVIER Foy MD with a diagnosis of:   -Class 2 severe obesity due to excess calories with serious comorbidity and body mass index (BMI) of 37.0 to 37.9 in adult       Seeking guidance on weight loss.    About to start Zepbound.     Goal: 200 lbs    Past attempts: lost 30 lbs in high school by going to the gym  Started college and gained weight.  Diagnosed with anxiety and medication. Activity reduced. Food choices may have changed.    Exercise: none currently    Food intake:  Breakfast: 4 mini sausage biscuits from freezer  Lunch: leftovers from dinner: pasta (1/2 plate) choice with shrimp or chicken (1/4 plate) + vegetables (1/4 plate)  Dinner: same as above  Snacks: none due to not buying any// at time cheese its, cookies, ice cream     Weekends: later breakfast intake (scrambled eggs, grits, sausage) and earlier dinner. Skips lunch.    Drinks: flavored sparkling water (low calorie), water  No sodas unless dining out (not often, at most twice per month).   Rare alcohol intake. When having any will be a small glass.     Challenge area: portion control     At current job for almost one year.  Currently stressed.  Will be receiving raise soon which will help to alleviate some stress.    Anthropometrics  Height:  71"     Weight:  275 lbs  BMI:  38.4  % Body Fat:  n/a     RECENT WEIGHTS      Weight (lb) Weight (kg) BMI (Calculated)   2023 265  120.203  37    2023 263.6  119.568  36.8    3/7/2025 272.27  123.5  38    2025 270  122.471  37.7          Clinical Signs/Symptoms  N/V/D:  none   Appetite:  good       Past Medical History:   Diagnosis Date    Anxiety disorder, unspecified     HSV-1 2023    Keratosis pilaris     Obesity (BMI 35.0-39.9 without comorbidity) 2023       Past Surgical History:   Procedure Laterality Date    " TONSILLECTOMY      wisdom teeth removal         Medications    has a current medication list which includes the following prescription(s): bupropion, fluticasone propionate, norgestimate-ethinyl estradiol, sertraline, zepbound, [START ON 6/16/2025] zepbound, and valacyclovir.    Vitamins, Minerals, and/or Supplements:  not discussed     Food/Medication Interactions:  Reviewed     Food Allergies or Intolerances:  NKFA noted in chart     Social History    Marital status:  Single  Employment:  yes    Social History     Tobacco Use    Smoking status: Never    Smokeless tobacco: Never   Substance Use Topics    Alcohol use: Yes     Comment: 1/wk        Lab Reports   Sodium   Date Value Ref Range Status   05/02/2025 139 136 - 145 mmol/L Final   03/07/2025 140 136 - 145 mmol/L Final     Potassium   Date Value Ref Range Status   05/02/2025 4.0 3.5 - 5.1 mmol/L Final   03/07/2025 3.5 3.5 - 5.1 mmol/L Final     Chloride   Date Value Ref Range Status   05/02/2025 103 95 - 110 mmol/L Final   03/07/2025 108 95 - 110 mmol/L Final     CO2   Date Value Ref Range Status   05/02/2025 29 23 - 29 mmol/L Final   03/07/2025 21 (L) 23 - 29 mmol/L Final     Glucose   Date Value Ref Range Status   05/02/2025 72 70 - 110 mg/dL Final   03/07/2025 117 (H) 70 - 110 mg/dL Final     BUN   Date Value Ref Range Status   05/02/2025 8 6 - 20 mg/dL Final     Creatinine   Date Value Ref Range Status   05/02/2025 0.6 0.5 - 1.4 mg/dL Final     Calcium   Date Value Ref Range Status   05/02/2025 9.2 8.7 - 10.5 mg/dL Final   03/07/2025 9.2 8.7 - 10.5 mg/dL Final     Protein Total   Date Value Ref Range Status   05/02/2025 7.4 6.0 - 8.4 gm/dL Final     Total Protein   Date Value Ref Range Status   03/07/2025 7.1 6.0 - 8.4 g/dL Final     Albumin   Date Value Ref Range Status   05/02/2025 3.6 3.5 - 5.2 g/dL Final   03/07/2025 3.5 3.5 - 5.2 g/dL Final     Total Bilirubin   Date Value Ref Range Status   03/07/2025 0.5 0.1 - 1.0 mg/dL Final     Comment:     For  infants and newborns, interpretation of results should be based  on gestational age, weight and in agreement with clinical  observations.    Premature Infant recommended reference ranges:  Up to 24 hours.............<8.0 mg/dL  Up to 48 hours............<12.0 mg/dL  3-5 days..................<15.0 mg/dL  6-29 days.................<15.0 mg/dL       Bilirubin Total   Date Value Ref Range Status   05/02/2025 1.0 0.1 - 1.0 mg/dL Final     Comment:     For infants and newborns, interpretation of results should be based   on gestational age, weight and in agreement with clinical   observations.    Premature Infant recommended reference ranges:   0-24 hours:  <8.0 mg/dL   24-48 hours: <12.0 mg/dL   3-5 days:    <15.0 mg/dL   6-29 days:   <15.0 mg/dL     Alkaline Phosphatase   Date Value Ref Range Status   03/07/2025 87 40 - 150 U/L Final     ALP   Date Value Ref Range Status   05/02/2025 93 40 - 150 unit/L Final     AST   Date Value Ref Range Status   05/02/2025 45 11 - 45 unit/L Final   03/07/2025 64 (H) 10 - 40 U/L Final     ALT   Date Value Ref Range Status   05/02/2025 37 10 - 44 unit/L Final   03/07/2025 42 10 - 44 U/L Final     Anion Gap   Date Value Ref Range Status   05/02/2025 7 (L) 8 - 16 mmol/L Final      Lab Results   Component Value Date    WBC 15.33 (H) 05/02/2025    HGB 12.6 05/02/2025    HCT 39.9 05/02/2025    MCV 91 05/02/2025     05/02/2025        Lab Results   Component Value Date    CHOL 185 05/02/2025     Lab Results   Component Value Date    HDL 49 05/02/2025     Lab Results   Component Value Date    LDLCALC 108.2 05/02/2025     Lab Results   Component Value Date    TRIG 139 05/02/2025     Lab Results   Component Value Date    CHOLHDL 26.5 05/02/2025     Lab Results   Component Value Date    HGBA1C 4.7 03/07/2025     BP Readings from Last 1 Encounters:   05/06/25 102/60       Food History  reviewed    Exercise History:  reviewed    Cultural/Spiritual/Personal Preferences:  None  identified    Support System:  family/friends    State of Change:  Contemplation    Barriers to Change:  money     Diagnosis    obesity related to excess energy intake as evidenced by BMI 38.    Intervention    RMR (Method:  [place] St. Jeor):  2097 kcal  Activity Factor:  1.2    ZOË:  2516 kcal    Goals:  1.  Plate method at meals. Choose high fiber carbohydrate selection.   2.  Increase fluid intake. Aim for 1/2 bodyweight (lbs) in ounces of caffeine free beverages. Current goal: 137 oz  3.  Increase daily steps.     Nutrition Education  The following education was provided to the patient:  Discussed meal planning/[employer] My Plate design.  Discussed weight management.  Suggested dietary modifications based on current dietary behaviors and individual food preferences.    Patient verbalized understanding of nutrition education and recommendations received.    Handouts Provided  Eat Fit Shopping List  Portion Plate    Monitoring/Evaluation    Monitor the following:  Weight  BMI  Caloric intake      Follow Up Plan:  as needed

## 2025-06-04 LAB
GENETICIST REVIEW: NORMAL
M BCR/ABL1 REFLEX RESULT: NORMAL
SPECIMEN SOURCE: NORMAL

## 2025-06-06 LAB
M MPNR RESULT: NORMAL
PATH REPORT.FINAL DX SPEC: NORMAL

## 2025-06-16 ENCOUNTER — RESULTS FOLLOW-UP (OUTPATIENT)
Dept: INTERNAL MEDICINE | Facility: CLINIC | Age: 25
End: 2025-06-16

## 2025-08-01 ENCOUNTER — OFFICE VISIT (OUTPATIENT)
Dept: INTERNAL MEDICINE | Facility: CLINIC | Age: 25
End: 2025-08-01
Payer: COMMERCIAL

## 2025-08-01 ENCOUNTER — PATIENT MESSAGE (OUTPATIENT)
Dept: INTERNAL MEDICINE | Facility: CLINIC | Age: 25
End: 2025-08-01

## 2025-08-01 VITALS — HEIGHT: 71 IN | WEIGHT: 250.69 LBS | BODY MASS INDEX: 35.1 KG/M2

## 2025-08-01 DIAGNOSIS — D72.828 NEUTROPHILIA: Chronic | ICD-10-CM

## 2025-08-01 DIAGNOSIS — E66.811 CLASS 1 OBESITY DUE TO EXCESS CALORIES WITH SERIOUS COMORBIDITY AND BODY MASS INDEX (BMI) OF 34.0 TO 34.9 IN ADULT: Primary | Chronic | ICD-10-CM

## 2025-08-01 DIAGNOSIS — F33.0 RECURRENT MILD MAJOR DEPRESSIVE DISORDER WITH ANXIETY: Chronic | ICD-10-CM

## 2025-08-01 DIAGNOSIS — F41.9 RECURRENT MILD MAJOR DEPRESSIVE DISORDER WITH ANXIETY: Chronic | ICD-10-CM

## 2025-08-01 DIAGNOSIS — E66.09 CLASS 1 OBESITY DUE TO EXCESS CALORIES WITH SERIOUS COMORBIDITY AND BODY MASS INDEX (BMI) OF 34.0 TO 34.9 IN ADULT: Primary | Chronic | ICD-10-CM

## 2025-08-01 PROCEDURE — 1160F RVW MEDS BY RX/DR IN RCRD: CPT | Mod: CPTII,95,, | Performed by: FAMILY MEDICINE

## 2025-08-01 PROCEDURE — 98006 SYNCH AUDIO-VIDEO EST MOD 30: CPT | Mod: 95,,, | Performed by: FAMILY MEDICINE

## 2025-08-01 PROCEDURE — 1159F MED LIST DOCD IN RCRD: CPT | Mod: CPTII,95,, | Performed by: FAMILY MEDICINE

## 2025-08-01 PROCEDURE — 3044F HG A1C LEVEL LT 7.0%: CPT | Mod: CPTII,95,, | Performed by: FAMILY MEDICINE

## 2025-08-01 PROCEDURE — G2211 COMPLEX E/M VISIT ADD ON: HCPCS | Mod: 95,,, | Performed by: FAMILY MEDICINE

## 2025-08-01 RX ORDER — BUPROPION HYDROCHLORIDE 300 MG/1
300 TABLET ORAL DAILY
Qty: 90 TABLET | Refills: 1 | Status: SHIPPED | OUTPATIENT
Start: 2025-08-01

## 2025-08-01 RX ORDER — SEMAGLUTIDE 1 MG/.5ML
1 INJECTION, SOLUTION SUBCUTANEOUS
Qty: 2 ML | Refills: 2 | Status: SHIPPED | OUTPATIENT
Start: 2025-08-01

## 2025-08-01 RX ORDER — SEMAGLUTIDE 0.5 MG/.5ML
0.5 INJECTION, SOLUTION SUBCUTANEOUS
Qty: 2 ML | Refills: 0 | Status: SHIPPED | OUTPATIENT
Start: 2025-08-01

## 2025-08-01 RX ORDER — SERTRALINE HYDROCHLORIDE 50 MG/1
50 TABLET, FILM COATED ORAL DAILY
Qty: 90 TABLET | Refills: 1 | Status: SHIPPED | OUTPATIENT
Start: 2025-08-01

## 2025-08-27 ENCOUNTER — E-VISIT (OUTPATIENT)
Dept: INTERNAL MEDICINE | Facility: CLINIC | Age: 25
End: 2025-08-27
Payer: COMMERCIAL

## 2025-08-27 ENCOUNTER — PATIENT MESSAGE (OUTPATIENT)
Dept: INTERNAL MEDICINE | Facility: CLINIC | Age: 25
End: 2025-08-27

## 2025-08-27 DIAGNOSIS — D72.828 NEUTROPHILIA: Primary | Chronic | ICD-10-CM

## 2025-08-27 DIAGNOSIS — E66.811 CLASS 1 OBESITY DUE TO EXCESS CALORIES WITH SERIOUS COMORBIDITY AND BODY MASS INDEX (BMI) OF 34.0 TO 34.9 IN ADULT: ICD-10-CM

## 2025-08-27 DIAGNOSIS — E66.09 CLASS 1 OBESITY DUE TO EXCESS CALORIES WITH SERIOUS COMORBIDITY AND BODY MASS INDEX (BMI) OF 34.0 TO 34.9 IN ADULT: ICD-10-CM

## 2025-08-27 RX ORDER — TIRZEPATIDE 5 MG/.5ML
5 INJECTION, SOLUTION SUBCUTANEOUS
Qty: 4 PEN | Refills: 2 | Status: SHIPPED | OUTPATIENT
Start: 2025-08-27

## 2025-08-27 RX ORDER — TIRZEPATIDE 5 MG/.5ML
5 INJECTION, SOLUTION SUBCUTANEOUS
Qty: 4 PEN | Refills: 2 | OUTPATIENT
Start: 2025-08-27

## 2025-08-29 ENCOUNTER — LAB VISIT (OUTPATIENT)
Dept: LAB | Facility: HOSPITAL | Age: 25
End: 2025-08-29
Attending: FAMILY MEDICINE
Payer: COMMERCIAL

## 2025-08-29 DIAGNOSIS — D72.828 NEUTROPHILIA: ICD-10-CM

## 2025-08-29 LAB
ABSOLUTE EOSINOPHIL (OHS): 0.2 K/UL
ABSOLUTE MONOCYTE (OHS): 0.72 K/UL (ref 0.3–1)
ABSOLUTE NEUTROPHIL COUNT (OHS): 9.78 K/UL (ref 1.8–7.7)
BASOPHILS # BLD AUTO: 0.03 K/UL
BASOPHILS NFR BLD AUTO: 0.2 %
CRP SERPL-MCNC: 25.7 MG/L
ERYTHROCYTE [DISTWIDTH] IN BLOOD BY AUTOMATED COUNT: 13.3 % (ref 11.5–14.5)
HCT VFR BLD AUTO: 41.6 % (ref 37–48.5)
HGB BLD-MCNC: 13.5 GM/DL (ref 12–16)
IMM GRANULOCYTES # BLD AUTO: 0.08 K/UL (ref 0–0.04)
IMM GRANULOCYTES NFR BLD AUTO: 0.6 % (ref 0–0.5)
LYMPHOCYTES # BLD AUTO: 3 K/UL (ref 1–4.8)
MCH RBC QN AUTO: 28.7 PG (ref 27–31)
MCHC RBC AUTO-ENTMCNC: 32.5 G/DL (ref 32–36)
MCV RBC AUTO: 88 FL (ref 82–98)
NUCLEATED RBC (/100WBC) (OHS): 0 /100 WBC
PLATELET # BLD AUTO: 273 K/UL (ref 150–450)
PMV BLD AUTO: 11.3 FL (ref 9.2–12.9)
RBC # BLD AUTO: 4.71 M/UL (ref 4–5.4)
RELATIVE EOSINOPHIL (OHS): 1.4 %
RELATIVE LYMPHOCYTE (OHS): 21.7 % (ref 18–48)
RELATIVE MONOCYTE (OHS): 5.2 % (ref 4–15)
RELATIVE NEUTROPHIL (OHS): 70.9 % (ref 38–73)
WBC # BLD AUTO: 13.81 K/UL (ref 3.9–12.7)

## 2025-08-29 PROCEDURE — 86140 C-REACTIVE PROTEIN: CPT

## 2025-08-29 PROCEDURE — 36415 COLL VENOUS BLD VENIPUNCTURE: CPT

## 2025-08-29 PROCEDURE — 85025 COMPLETE CBC W/AUTO DIFF WBC: CPT
